# Patient Record
Sex: FEMALE | Race: WHITE | Employment: PART TIME | URBAN - METROPOLITAN AREA
[De-identification: names, ages, dates, MRNs, and addresses within clinical notes are randomized per-mention and may not be internally consistent; named-entity substitution may affect disease eponyms.]

---

## 2019-10-28 ENCOUNTER — HOSPITAL ENCOUNTER (OUTPATIENT)
Dept: LAB | Age: 64
Discharge: HOME | End: 2019-10-28

## 2019-10-28 ENCOUNTER — HOSPITAL ENCOUNTER (OUTPATIENT)
Dept: NON INVASIVE DIAGNOSTICS | Age: 64
Discharge: HOME | End: 2019-10-28
Attending: ORTHOPAEDIC SURGERY
Payer: COMMERCIAL

## 2019-10-28 DIAGNOSIS — Z01.818 OTHER SPECIFIED PRE-OPERATIVE EXAMINATION: ICD-10-CM

## 2019-10-28 DIAGNOSIS — M16.11 PRIMARY OSTEOARTHRITIS OF RIGHT HIP: ICD-10-CM

## 2019-10-28 LAB — SENTARA SPECIMEN COL,SENBCF: NORMAL

## 2019-10-28 PROCEDURE — 99001 SPECIMEN HANDLING PT-LAB: CPT

## 2019-10-28 PROCEDURE — 93005 ELECTROCARDIOGRAM TRACING: CPT

## 2019-10-29 LAB
ATRIAL RATE: 61 BPM
CALCULATED P AXIS, ECG09: 36 DEGREES
CALCULATED R AXIS, ECG10: 39 DEGREES
CALCULATED T AXIS, ECG11: 40 DEGREES
DIAGNOSIS, 93000: NORMAL
P-R INTERVAL, ECG05: 150 MS
Q-T INTERVAL, ECG07: 418 MS
QRS DURATION, ECG06: 96 MS
QTC CALCULATION (BEZET), ECG08: 420 MS
VENTRICULAR RATE, ECG03: 61 BPM

## 2019-11-19 ENCOUNTER — HOSPITAL ENCOUNTER (OUTPATIENT)
Dept: LAB | Age: 64
Discharge: HOME | End: 2019-11-19

## 2019-11-19 LAB — SENTARA SPECIMEN COL,SENBCF: NORMAL

## 2019-11-19 PROCEDURE — 99001 SPECIMEN HANDLING PT-LAB: CPT

## 2019-11-23 ENCOUNTER — ANESTHESIA EVENT (OUTPATIENT)
Dept: SURGERY | Age: 64
DRG: 470 | End: 2019-11-23
Payer: COMMERCIAL

## 2019-11-23 PROBLEM — M16.11 OSTEOARTHRITIS OF RIGHT HIP: Chronic | Status: ACTIVE | Noted: 2019-11-23

## 2019-11-23 NOTE — H&P
[address] [facility] [address] Medicine  History and Physical Exam    Patient: Linh Gomez MRN: [id number]  SSN: [id number]    YOB: 1955  Age: 59 y.o. Sex: female      Subjective:      Chief Complaint: right hip pain    History of Present Illness:  Patient complains of right hip pain and difficulty ambulating, which has progressed over the past several months. X-rays showed osteoarthritis of the joint. The patient's pain has persisted and progressed despite conservative treatments and therapies. The patient has been previously treated with medications and/or injections. The patient has at this time opted for surgical intervention. Past Medical History:   Diagnosis Date    Arrhythmia 2016    H/O     Arthritis     Autoimmune disease ([address]     H/O  sarcodosis    GERD (gastroesophageal reflux disease)     Osteoarthritis of right hip 11/23/2019    Sleep apnea     mouth guard  instructed to bring DOS    Thyroid disease      Past Surgical History:   Procedure Laterality Date    HX CATARACT REMOVAL      HX CERVICAL LAMINECTOMY      HX GASTRIC BYPASS  2016    sleeve    HX LAP CHOLECYSTECTOMY      HX ORTHOPAEDIC Right     ankle     Social History     Occupational History    Not on file   Tobacco Use    Smoking status: Never Smoker    Smokeless tobacco: Never Used   Substance and Sexual Activity    Alcohol use: Yes     Comment: 6 x year    Drug use: Not Currently    Sexual activity: Not on file     Prior to Admission medications    Medication Sig Start Date End Date Taking? Authorizing Provider   celecoxib (CELEBREX) 100 mg capsule Take 100 mg by mouth two (2) times a day. Provider, Historical   hydroCHLOROthiazide (HYDRODIURIL) 25 mg tablet Take 25 mg by mouth daily. Provider, Historical   levothyroxine (SYNTHROID) 50 mcg tablet Take 50 mcg by mouth Daily (before breakfast).     Provider, Historical   ergocalciferol (VITAMIN D2) 50,000 unit capsule Take 50,000 Units by mouth every seven (7) days. Provider, Historical   omeprazole (PRILOSEC OTC) 20 mg tablet Take 20 mg by mouth daily. Provider, Historical   acetaminophen (TYLENOL) 325 mg tablet Take 325 mg by mouth every four (4) hours as needed for Pain. Provider, Historical   inulin/cholecalciferol, D3, (FIBER GUMMIES WITH VITAMIN D3 PO) Take  by mouth. Provider, Historical       Allergies: Allergies   Allergen Reactions    Codeine Nausea and Vomiting    Erythromycin Nausea and Vomiting    Penicillins Nausea and Vomiting        Review of Systems:  Pertinent items are noted in the History of Present Illness. Objective:       Physical Exam:  HEENT: Normocephalic, atraumatic  Lungs:  Clear to auscultation  Heart:   Regular rate and rhythm  Abdomen: Soft  Extremities:  Pain with range of motion of the right hip. Passive flexion  degrees,                       passive internal rotation 0-10 degrees, with pain throughout ROM,                        passive external rotation 10-20 degrees with pain at the arc of motion. Antalgic gait noted. Assessment:      Arthritis of the right hip. Plan:       Proceed with scheduled RIGHT TOTAL HIP ARTHROPLASTY. The various methods of treatment have been discussed with the patient and family. After consideration of risks, benefits, and other options for treatment, the patient has consented to surgical interventions. Questions were answered and preoperative teaching was done by Dr Stephen Limon.      Signed By: MIKE Yu     November 23, 2019

## 2019-11-25 ENCOUNTER — HOSPITAL ENCOUNTER (INPATIENT)
Age: 64
LOS: 1 days | Discharge: HOME HEALTH | DRG: 470 | End: 2019-11-26
Attending: ORTHOPAEDIC SURGERY | Admitting: ORTHOPAEDIC SURGERY
Payer: COMMERCIAL

## 2019-11-25 ENCOUNTER — ANESTHESIA (OUTPATIENT)
Dept: SURGERY | Age: 64
DRG: 470 | End: 2019-11-25
Payer: COMMERCIAL

## 2019-11-25 ENCOUNTER — APPOINTMENT (OUTPATIENT)
Dept: GENERAL RADIOLOGY | Age: 64
DRG: 470 | End: 2019-11-25
Attending: ORTHOPAEDIC SURGERY
Payer: COMMERCIAL

## 2019-11-25 ENCOUNTER — APPOINTMENT (OUTPATIENT)
Dept: GENERAL RADIOLOGY | Age: 64
DRG: 470 | End: 2019-11-25
Attending: PHYSICIAN ASSISTANT
Payer: COMMERCIAL

## 2019-11-25 DIAGNOSIS — M16.11 PRIMARY OSTEOARTHRITIS OF RIGHT HIP: Primary | Chronic | ICD-10-CM

## 2019-11-25 LAB
ABO + RH BLD: NORMAL
ATRIAL RATE: 76 BPM
BLOOD GROUP ANTIBODIES SERPL: NORMAL
CALCULATED P AXIS, ECG09: 62 DEGREES
CALCULATED R AXIS, ECG10: -6 DEGREES
CALCULATED T AXIS, ECG11: 12 DEGREES
DIAGNOSIS, 93000: NORMAL
P-R INTERVAL, ECG05: 144 MS
Q-T INTERVAL, ECG07: 406 MS
QRS DURATION, ECG06: 94 MS
QTC CALCULATION (BEZET), ECG08: 456 MS
SPECIMEN EXP DATE BLD: NORMAL
VENTRICULAR RATE, ECG03: 76 BPM

## 2019-11-25 PROCEDURE — 77030003666 HC NDL SPINAL BD -A: Performed by: ORTHOPAEDIC SURGERY

## 2019-11-25 PROCEDURE — 74011250636 HC RX REV CODE- 250/636: Performed by: PHYSICIAN ASSISTANT

## 2019-11-25 PROCEDURE — 74011250636 HC RX REV CODE- 250/636: Performed by: ORTHOPAEDIC SURGERY

## 2019-11-25 PROCEDURE — 93005 ELECTROCARDIOGRAM TRACING: CPT

## 2019-11-25 PROCEDURE — 77030020782 HC GWN BAIR PAWS FLX 3M -B: Performed by: ORTHOPAEDIC SURGERY

## 2019-11-25 PROCEDURE — 77030037713 HC CLOSR DEV INCIS ZIP STRY -B: Performed by: ORTHOPAEDIC SURGERY

## 2019-11-25 PROCEDURE — 77030013708 HC HNDPC SUC IRR PULS STRY –B: Performed by: ORTHOPAEDIC SURGERY

## 2019-11-25 PROCEDURE — 74011250636 HC RX REV CODE- 250/636: Performed by: SPECIALIST

## 2019-11-25 PROCEDURE — 74011000250 HC RX REV CODE- 250: Performed by: ORTHOPAEDIC SURGERY

## 2019-11-25 PROCEDURE — 3E0U3BZ INTRODUCTION OF ANESTHETIC AGENT INTO JOINTS, PERCUTANEOUS APPROACH: ICD-10-PCS | Performed by: ORTHOPAEDIC SURGERY

## 2019-11-25 PROCEDURE — C1776 JOINT DEVICE (IMPLANTABLE): HCPCS | Performed by: ORTHOPAEDIC SURGERY

## 2019-11-25 PROCEDURE — 74011250637 HC RX REV CODE- 250/637: Performed by: PHYSICIAN ASSISTANT

## 2019-11-25 PROCEDURE — 77030038010: Performed by: ORTHOPAEDIC SURGERY

## 2019-11-25 PROCEDURE — 86900 BLOOD TYPING SEROLOGIC ABO: CPT

## 2019-11-25 PROCEDURE — 77030027138 HC INCENT SPIROMETER -A: Performed by: ORTHOPAEDIC SURGERY

## 2019-11-25 PROCEDURE — 76210000017 HC OR PH I REC 1.5 TO 2 HR: Performed by: ORTHOPAEDIC SURGERY

## 2019-11-25 PROCEDURE — 74011250637 HC RX REV CODE- 250/637: Performed by: SPECIALIST

## 2019-11-25 PROCEDURE — 0SR902A REPLACEMENT OF RIGHT HIP JOINT WITH METAL ON POLYETHYLENE SYNTHETIC SUBSTITUTE, UNCEMENTED, OPEN APPROACH: ICD-10-PCS | Performed by: ORTHOPAEDIC SURGERY

## 2019-11-25 PROCEDURE — 3E0U33Z INTRODUCTION OF ANTI-INFLAMMATORY INTO JOINTS, PERCUTANEOUS APPROACH: ICD-10-PCS | Performed by: ORTHOPAEDIC SURGERY

## 2019-11-25 PROCEDURE — 74011000250 HC RX REV CODE- 250: Performed by: NURSE ANESTHETIST, CERTIFIED REGISTERED

## 2019-11-25 PROCEDURE — 36415 COLL VENOUS BLD VENIPUNCTURE: CPT

## 2019-11-25 PROCEDURE — 76010000149 HC OR TIME 1 TO 1.5 HR: Performed by: ORTHOPAEDIC SURGERY

## 2019-11-25 PROCEDURE — 76060000033 HC ANESTHESIA 1 TO 1.5 HR: Performed by: ORTHOPAEDIC SURGERY

## 2019-11-25 PROCEDURE — 77030018836 HC SOL IRR NACL ICUM -A: Performed by: ORTHOPAEDIC SURGERY

## 2019-11-25 PROCEDURE — 77030040361 HC SLV COMPR DVT MDII -B: Performed by: ORTHOPAEDIC SURGERY

## 2019-11-25 PROCEDURE — 74011250636 HC RX REV CODE- 250/636: Performed by: NURSE ANESTHETIST, CERTIFIED REGISTERED

## 2019-11-25 PROCEDURE — 77030031139 HC SUT VCRL2 J&J -A: Performed by: ORTHOPAEDIC SURGERY

## 2019-11-25 PROCEDURE — 77030033263 HC DRSG MEPILEX 16-48IN BORD MOLN -B: Performed by: ORTHOPAEDIC SURGERY

## 2019-11-25 PROCEDURE — 73501 X-RAY EXAM HIP UNI 1 VIEW: CPT

## 2019-11-25 PROCEDURE — 77030034694 HC SCPL CANADY PLSM DISP USMD -E: Performed by: ORTHOPAEDIC SURGERY

## 2019-11-25 PROCEDURE — 77030012508 HC MSK AIRWY LMA AMBU -A: Performed by: SPECIALIST

## 2019-11-25 PROCEDURE — 65270000029 HC RM PRIVATE

## 2019-11-25 DEVICE — STEM FEM PRSS FT HIP HRD SURF: Type: IMPLANTABLE DEVICE | Site: HIP | Status: FUNCTIONAL

## 2019-11-25 DEVICE — FEMORAL HEAD 32-12/14+6
Type: IMPLANTABLE DEVICE | Site: HIP | Status: FUNCTIONAL
Brand: DELTA CERAMIC FEMORAL HEAD

## 2019-11-25 DEVICE — SIZE 9 STD COLLAR
Type: IMPLANTABLE DEVICE | Site: HIP | Status: FUNCTIONAL
Brand: ENTRADA HIP STEM

## 2019-11-25 DEVICE — ACETABULAR LINER NEUTRAL 32/50
Type: IMPLANTABLE DEVICE | Site: HIP | Status: FUNCTIONAL
Brand: LEGEND

## 2019-11-25 DEVICE — THREE HOLE, 50 MM
Type: IMPLANTABLE DEVICE | Site: HIP | Status: FUNCTIONAL
Brand: LEGEND ACETABULAR SHELL

## 2019-11-25 DEVICE — LOW PROFILE CANCELLOUS SCREW 25MM
Type: IMPLANTABLE DEVICE | Site: HIP | Status: FUNCTIONAL
Brand: ESCALADE ACETABULAR SYSTEM

## 2019-11-25 RX ORDER — ACETAMINOPHEN 500 MG
1000 TABLET ORAL ONCE
Status: DISCONTINUED | OUTPATIENT
Start: 2019-11-25 | End: 2019-11-25 | Stop reason: SDUPTHER

## 2019-11-25 RX ORDER — FENTANYL CITRATE 50 UG/ML
50 INJECTION, SOLUTION INTRAMUSCULAR; INTRAVENOUS
Status: DISCONTINUED | OUTPATIENT
Start: 2019-11-25 | End: 2019-11-25 | Stop reason: HOSPADM

## 2019-11-25 RX ORDER — FENTANYL CITRATE 50 UG/ML
25 INJECTION, SOLUTION INTRAMUSCULAR; INTRAVENOUS AS NEEDED
Status: DISCONTINUED | OUTPATIENT
Start: 2019-11-25 | End: 2019-11-25 | Stop reason: HOSPADM

## 2019-11-25 RX ORDER — ACETAMINOPHEN 325 MG/1
650 TABLET ORAL EVERY 6 HOURS
Status: DISCONTINUED | OUTPATIENT
Start: 2019-11-25 | End: 2019-11-26 | Stop reason: HOSPADM

## 2019-11-25 RX ORDER — TRANEXAMIC ACID 650 1/1
1950 TABLET ORAL ONCE
Status: COMPLETED | OUTPATIENT
Start: 2019-11-25 | End: 2019-11-25

## 2019-11-25 RX ORDER — KETOROLAC TROMETHAMINE 15 MG/ML
INJECTION, SOLUTION INTRAMUSCULAR; INTRAVENOUS AS NEEDED
Status: DISCONTINUED | OUTPATIENT
Start: 2019-11-25 | End: 2019-11-25 | Stop reason: HOSPADM

## 2019-11-25 RX ORDER — CEFAZOLIN SODIUM/WATER 2 G/20 ML
2 SYRINGE (ML) INTRAVENOUS EVERY 8 HOURS
Status: COMPLETED | OUTPATIENT
Start: 2019-11-25 | End: 2019-11-26

## 2019-11-25 RX ORDER — NALOXONE HYDROCHLORIDE 0.4 MG/ML
0.4 INJECTION, SOLUTION INTRAMUSCULAR; INTRAVENOUS; SUBCUTANEOUS AS NEEDED
Status: DISCONTINUED | OUTPATIENT
Start: 2019-11-25 | End: 2019-11-26 | Stop reason: HOSPADM

## 2019-11-25 RX ORDER — KETOROLAC TROMETHAMINE 30 MG/ML
30 INJECTION, SOLUTION INTRAMUSCULAR; INTRAVENOUS
Status: DISCONTINUED | OUTPATIENT
Start: 2019-11-25 | End: 2019-11-25 | Stop reason: HOSPADM

## 2019-11-25 RX ORDER — OXYCODONE AND ACETAMINOPHEN 5; 325 MG/1; MG/1
1 TABLET ORAL AS NEEDED
Status: DISCONTINUED | OUTPATIENT
Start: 2019-11-25 | End: 2019-11-25 | Stop reason: HOSPADM

## 2019-11-25 RX ORDER — DIPHENHYDRAMINE HYDROCHLORIDE 50 MG/ML
12.5 INJECTION, SOLUTION INTRAMUSCULAR; INTRAVENOUS
Status: DISCONTINUED | OUTPATIENT
Start: 2019-11-25 | End: 2019-11-26 | Stop reason: HOSPADM

## 2019-11-25 RX ORDER — ASPIRIN 81 MG/1
81 TABLET ORAL 2 TIMES DAILY
Qty: 42 TAB | Refills: 0 | Status: SHIPPED | OUTPATIENT
Start: 2019-11-25 | End: 2019-12-16

## 2019-11-25 RX ORDER — PREGABALIN 25 MG/1
25 CAPSULE ORAL ONCE
Status: COMPLETED | OUTPATIENT
Start: 2019-11-25 | End: 2019-11-25

## 2019-11-25 RX ORDER — ONDANSETRON 2 MG/ML
4 INJECTION INTRAMUSCULAR; INTRAVENOUS
Status: DISCONTINUED | OUTPATIENT
Start: 2019-11-25 | End: 2019-11-26 | Stop reason: HOSPADM

## 2019-11-25 RX ORDER — OXYCODONE AND ACETAMINOPHEN 5; 325 MG/1; MG/1
1 TABLET ORAL
Qty: 42 TAB | Refills: 0 | Status: SHIPPED | OUTPATIENT
Start: 2019-11-25 | End: 2019-12-02

## 2019-11-25 RX ORDER — ONDANSETRON 2 MG/ML
4 INJECTION INTRAMUSCULAR; INTRAVENOUS ONCE
Status: COMPLETED | OUTPATIENT
Start: 2019-11-25 | End: 2019-11-25

## 2019-11-25 RX ORDER — GLYCOPYRROLATE 0.2 MG/ML
INJECTION INTRAMUSCULAR; INTRAVENOUS AS NEEDED
Status: DISCONTINUED | OUTPATIENT
Start: 2019-11-25 | End: 2019-11-25 | Stop reason: HOSPADM

## 2019-11-25 RX ORDER — SODIUM CHLORIDE 0.9 % (FLUSH) 0.9 %
5-40 SYRINGE (ML) INJECTION EVERY 8 HOURS
Status: DISCONTINUED | OUTPATIENT
Start: 2019-11-25 | End: 2019-11-26 | Stop reason: HOSPADM

## 2019-11-25 RX ORDER — CELECOXIB 100 MG/1
400 CAPSULE ORAL ONCE
Status: COMPLETED | OUTPATIENT
Start: 2019-11-25 | End: 2019-11-25

## 2019-11-25 RX ORDER — DEXTROSE MONOHYDRATE 100 MG/ML
125-250 INJECTION, SOLUTION INTRAVENOUS AS NEEDED
Status: DISCONTINUED | OUTPATIENT
Start: 2019-11-25 | End: 2019-11-25 | Stop reason: HOSPADM

## 2019-11-25 RX ORDER — ASPIRIN 81 MG/1
81 TABLET ORAL 2 TIMES DAILY
Status: DISCONTINUED | OUTPATIENT
Start: 2019-11-25 | End: 2019-11-26 | Stop reason: HOSPADM

## 2019-11-25 RX ORDER — LANOLIN ALCOHOL/MO/W.PET/CERES
1 CREAM (GRAM) TOPICAL 3 TIMES DAILY
Status: DISCONTINUED | OUTPATIENT
Start: 2019-11-25 | End: 2019-11-26 | Stop reason: HOSPADM

## 2019-11-25 RX ORDER — SODIUM CHLORIDE 0.9 % (FLUSH) 0.9 %
5-40 SYRINGE (ML) INJECTION AS NEEDED
Status: DISCONTINUED | OUTPATIENT
Start: 2019-11-25 | End: 2019-11-26 | Stop reason: HOSPADM

## 2019-11-25 RX ORDER — KETOROLAC TROMETHAMINE 30 MG/ML
15 INJECTION, SOLUTION INTRAMUSCULAR; INTRAVENOUS EVERY 6 HOURS
Status: DISCONTINUED | OUTPATIENT
Start: 2019-11-25 | End: 2019-11-26 | Stop reason: HOSPADM

## 2019-11-25 RX ORDER — SODIUM CHLORIDE, SODIUM LACTATE, POTASSIUM CHLORIDE, CALCIUM CHLORIDE 600; 310; 30; 20 MG/100ML; MG/100ML; MG/100ML; MG/100ML
125 INJECTION, SOLUTION INTRAVENOUS CONTINUOUS
Status: DISCONTINUED | OUTPATIENT
Start: 2019-11-25 | End: 2019-11-26 | Stop reason: HOSPADM

## 2019-11-25 RX ORDER — ZOLPIDEM TARTRATE 5 MG/1
5-10 TABLET ORAL
Status: DISCONTINUED | OUTPATIENT
Start: 2019-11-25 | End: 2019-11-26 | Stop reason: HOSPADM

## 2019-11-25 RX ORDER — METOCLOPRAMIDE HYDROCHLORIDE 5 MG/ML
10 INJECTION INTRAMUSCULAR; INTRAVENOUS
Status: DISCONTINUED | OUTPATIENT
Start: 2019-11-25 | End: 2019-11-26 | Stop reason: HOSPADM

## 2019-11-25 RX ORDER — LEVOTHYROXINE SODIUM 50 UG/1
50 TABLET ORAL
Status: DISCONTINUED | OUTPATIENT
Start: 2019-11-26 | End: 2019-11-26 | Stop reason: HOSPADM

## 2019-11-25 RX ORDER — SODIUM CHLORIDE 0.9 % (FLUSH) 0.9 %
5-40 SYRINGE (ML) INJECTION AS NEEDED
Status: DISCONTINUED | OUTPATIENT
Start: 2019-11-25 | End: 2019-11-25 | Stop reason: HOSPADM

## 2019-11-25 RX ORDER — LABETALOL HCL 20 MG/4 ML
SYRINGE (ML) INTRAVENOUS AS NEEDED
Status: DISCONTINUED | OUTPATIENT
Start: 2019-11-25 | End: 2019-11-25 | Stop reason: HOSPADM

## 2019-11-25 RX ORDER — HYDROMORPHONE HYDROCHLORIDE 2 MG/ML
INJECTION, SOLUTION INTRAMUSCULAR; INTRAVENOUS; SUBCUTANEOUS AS NEEDED
Status: DISCONTINUED | OUTPATIENT
Start: 2019-11-25 | End: 2019-11-25 | Stop reason: HOSPADM

## 2019-11-25 RX ORDER — HYDROMORPHONE HYDROCHLORIDE 2 MG/ML
0.2 INJECTION, SOLUTION INTRAMUSCULAR; INTRAVENOUS; SUBCUTANEOUS
Status: DISCONTINUED | OUTPATIENT
Start: 2019-11-25 | End: 2019-11-25 | Stop reason: HOSPADM

## 2019-11-25 RX ORDER — DOCUSATE SODIUM 100 MG/1
100 CAPSULE, LIQUID FILLED ORAL DAILY
Status: DISCONTINUED | OUTPATIENT
Start: 2019-11-26 | End: 2019-11-26 | Stop reason: HOSPADM

## 2019-11-25 RX ORDER — CEFAZOLIN SODIUM/WATER 2 G/20 ML
2 SYRINGE (ML) INTRAVENOUS ONCE
Status: COMPLETED | OUTPATIENT
Start: 2019-11-25 | End: 2019-11-25

## 2019-11-25 RX ORDER — ACETAMINOPHEN 500 MG
1000 TABLET ORAL ONCE
Status: COMPLETED | OUTPATIENT
Start: 2019-11-25 | End: 2019-11-25

## 2019-11-25 RX ORDER — DEXMEDETOMIDINE HYDROCHLORIDE 100 UG/ML
INJECTION, SOLUTION INTRAVENOUS AS NEEDED
Status: DISCONTINUED | OUTPATIENT
Start: 2019-11-25 | End: 2019-11-25 | Stop reason: HOSPADM

## 2019-11-25 RX ORDER — OMEPRAZOLE 20 MG/1
20 CAPSULE, DELAYED RELEASE ORAL DAILY
Status: DISCONTINUED | OUTPATIENT
Start: 2019-11-26 | End: 2019-11-26 | Stop reason: HOSPADM

## 2019-11-25 RX ORDER — KETAMINE HYDROCHLORIDE 10 MG/ML
INJECTION, SOLUTION INTRAMUSCULAR; INTRAVENOUS AS NEEDED
Status: DISCONTINUED | OUTPATIENT
Start: 2019-11-25 | End: 2019-11-25 | Stop reason: HOSPADM

## 2019-11-25 RX ORDER — MIDAZOLAM HYDROCHLORIDE 1 MG/ML
INJECTION, SOLUTION INTRAMUSCULAR; INTRAVENOUS AS NEEDED
Status: DISCONTINUED | OUTPATIENT
Start: 2019-11-25 | End: 2019-11-25 | Stop reason: HOSPADM

## 2019-11-25 RX ORDER — LIDOCAINE HYDROCHLORIDE 20 MG/ML
INJECTION, SOLUTION EPIDURAL; INFILTRATION; INTRACAUDAL; PERINEURAL AS NEEDED
Status: DISCONTINUED | OUTPATIENT
Start: 2019-11-25 | End: 2019-11-25 | Stop reason: HOSPADM

## 2019-11-25 RX ORDER — ONDANSETRON 2 MG/ML
INJECTION INTRAMUSCULAR; INTRAVENOUS AS NEEDED
Status: DISCONTINUED | OUTPATIENT
Start: 2019-11-25 | End: 2019-11-25 | Stop reason: HOSPADM

## 2019-11-25 RX ORDER — SODIUM CHLORIDE 9 MG/ML
125 INJECTION, SOLUTION INTRAVENOUS CONTINUOUS
Status: DISCONTINUED | OUTPATIENT
Start: 2019-11-25 | End: 2019-11-26 | Stop reason: HOSPADM

## 2019-11-25 RX ORDER — HYDROCHLOROTHIAZIDE 25 MG/1
25 TABLET ORAL DAILY
Status: DISCONTINUED | OUTPATIENT
Start: 2019-11-26 | End: 2019-11-26 | Stop reason: HOSPADM

## 2019-11-25 RX ORDER — SODIUM CHLORIDE, SODIUM LACTATE, POTASSIUM CHLORIDE, CALCIUM CHLORIDE 600; 310; 30; 20 MG/100ML; MG/100ML; MG/100ML; MG/100ML
100 INJECTION, SOLUTION INTRAVENOUS CONTINUOUS
Status: DISCONTINUED | OUTPATIENT
Start: 2019-11-25 | End: 2019-11-25 | Stop reason: HOSPADM

## 2019-11-25 RX ORDER — SODIUM CHLORIDE 9 MG/ML
300 INJECTION, SOLUTION INTRAVENOUS CONTINUOUS
Status: DISPENSED | OUTPATIENT
Start: 2019-11-25 | End: 2019-11-25

## 2019-11-25 RX ORDER — PROPOFOL 10 MG/ML
INJECTION, EMULSION INTRAVENOUS AS NEEDED
Status: DISCONTINUED | OUTPATIENT
Start: 2019-11-25 | End: 2019-11-25 | Stop reason: HOSPADM

## 2019-11-25 RX ORDER — OXYCODONE HYDROCHLORIDE 5 MG/1
5-10 TABLET ORAL
Status: DISCONTINUED | OUTPATIENT
Start: 2019-11-25 | End: 2019-11-26 | Stop reason: HOSPADM

## 2019-11-25 RX ORDER — METHYLPREDNISOLONE ACETATE 80 MG/ML
INJECTION, SUSPENSION INTRA-ARTICULAR; INTRALESIONAL; INTRAMUSCULAR; SOFT TISSUE AS NEEDED
Status: DISCONTINUED | OUTPATIENT
Start: 2019-11-25 | End: 2019-11-25 | Stop reason: HOSPADM

## 2019-11-25 RX ORDER — MAGNESIUM SULFATE 100 %
4 CRYSTALS MISCELLANEOUS AS NEEDED
Status: DISCONTINUED | OUTPATIENT
Start: 2019-11-25 | End: 2019-11-25 | Stop reason: HOSPADM

## 2019-11-25 RX ORDER — DEXAMETHASONE SODIUM PHOSPHATE 4 MG/ML
8 INJECTION, SOLUTION INTRA-ARTICULAR; INTRALESIONAL; INTRAMUSCULAR; INTRAVENOUS; SOFT TISSUE ONCE
Status: COMPLETED | OUTPATIENT
Start: 2019-11-25 | End: 2019-11-25

## 2019-11-25 RX ORDER — SCOLOPAMINE TRANSDERMAL SYSTEM 1 MG/1
1 PATCH, EXTENDED RELEASE TRANSDERMAL
Status: DISCONTINUED | OUTPATIENT
Start: 2019-11-25 | End: 2019-11-26 | Stop reason: HOSPADM

## 2019-11-25 RX ORDER — SODIUM CHLORIDE 0.9 % (FLUSH) 0.9 %
5-40 SYRINGE (ML) INJECTION EVERY 8 HOURS
Status: DISCONTINUED | OUTPATIENT
Start: 2019-11-25 | End: 2019-11-25 | Stop reason: HOSPADM

## 2019-11-25 RX ADMIN — LABETALOL 20 MG/4 ML (5 MG/ML) INTRAVENOUS SYRINGE 10 MG: at 14:50

## 2019-11-25 RX ADMIN — CELECOXIB 400 MG: 100 CAPSULE ORAL at 12:52

## 2019-11-25 RX ADMIN — LIDOCAINE HYDROCHLORIDE 80 MG: 20 INJECTION, SOLUTION EPIDURAL; INFILTRATION; INTRACAUDAL; PERINEURAL at 14:45

## 2019-11-25 RX ADMIN — TRANEXAMIC ACID 1950 MG: 650 TABLET ORAL at 12:05

## 2019-11-25 RX ADMIN — ONDANSETRON HYDROCHLORIDE 4 MG: 2 INJECTION INTRAMUSCULAR; INTRAVENOUS at 14:47

## 2019-11-25 RX ADMIN — HYDROMORPHONE HYDROCHLORIDE 2 MG: 2 INJECTION, SOLUTION INTRAMUSCULAR; INTRAVENOUS; SUBCUTANEOUS at 14:45

## 2019-11-25 RX ADMIN — METOCLOPRAMIDE 10 MG: 5 INJECTION, SOLUTION INTRAMUSCULAR; INTRAVENOUS at 22:37

## 2019-11-25 RX ADMIN — SODIUM CHLORIDE 300 ML/HR: 900 INJECTION, SOLUTION INTRAVENOUS at 18:46

## 2019-11-25 RX ADMIN — FERROUS SULFATE TAB 325 MG (65 MG ELEMENTAL FE) 325 MG: 325 (65 FE) TAB at 22:16

## 2019-11-25 RX ADMIN — GLYCOPYRROLATE 0.2 MG: 0.2 INJECTION INTRAMUSCULAR; INTRAVENOUS at 14:39

## 2019-11-25 RX ADMIN — ACETAMINOPHEN 1000 MG: 500 TABLET ORAL at 12:52

## 2019-11-25 RX ADMIN — SODIUM CHLORIDE 125 ML/HR: 900 INJECTION, SOLUTION INTRAVENOUS at 19:44

## 2019-11-25 RX ADMIN — ONDANSETRON 4 MG: 2 INJECTION INTRAMUSCULAR; INTRAVENOUS at 17:29

## 2019-11-25 RX ADMIN — SODIUM CHLORIDE, SODIUM LACTATE, POTASSIUM CHLORIDE, AND CALCIUM CHLORIDE: 600; 310; 30; 20 INJECTION, SOLUTION INTRAVENOUS at 15:30

## 2019-11-25 RX ADMIN — SODIUM CHLORIDE, SODIUM LACTATE, POTASSIUM CHLORIDE, AND CALCIUM CHLORIDE 1000 ML: 600; 310; 30; 20 INJECTION, SOLUTION INTRAVENOUS at 12:13

## 2019-11-25 RX ADMIN — Medication 2 G: at 14:41

## 2019-11-25 RX ADMIN — PREGABALIN 25 MG: 25 CAPSULE ORAL at 12:52

## 2019-11-25 RX ADMIN — KETAMINE HYDROCHLORIDE 50 MG: 10 INJECTION, SOLUTION INTRAMUSCULAR; INTRAVENOUS at 14:45

## 2019-11-25 RX ADMIN — ASPIRIN 81 MG: 81 TABLET, COATED ORAL at 22:16

## 2019-11-25 RX ADMIN — KETOROLAC TROMETHAMINE 15 MG: 30 INJECTION, SOLUTION INTRAMUSCULAR at 22:16

## 2019-11-25 RX ADMIN — DEXAMETHASONE SODIUM PHOSPHATE 8 MG: 4 INJECTION, SOLUTION INTRAMUSCULAR; INTRAVENOUS at 12:52

## 2019-11-25 RX ADMIN — KETOROLAC TROMETHAMINE 30 MG: 15 INJECTION, SOLUTION INTRAMUSCULAR; INTRAVENOUS at 15:42

## 2019-11-25 RX ADMIN — SODIUM CHLORIDE, SODIUM LACTATE, POTASSIUM CHLORIDE, AND CALCIUM CHLORIDE 125 ML/HR: 600; 310; 30; 20 INJECTION, SOLUTION INTRAVENOUS at 18:09

## 2019-11-25 RX ADMIN — SODIUM CHLORIDE, SODIUM LACTATE, POTASSIUM CHLORIDE, AND CALCIUM CHLORIDE 125 ML/HR: 600; 310; 30; 20 INJECTION, SOLUTION INTRAVENOUS at 12:12

## 2019-11-25 RX ADMIN — DEXMEDETOMIDINE HYDROCHLORIDE 16 MCG: 100 INJECTION, SOLUTION INTRAVENOUS at 14:47

## 2019-11-25 RX ADMIN — Medication 2 G: at 22:15

## 2019-11-25 RX ADMIN — ONDANSETRON 4 MG: 2 INJECTION INTRAMUSCULAR; INTRAVENOUS at 20:14

## 2019-11-25 RX ADMIN — PROPOFOL 200 MG: 10 INJECTION, EMULSION INTRAVENOUS at 14:45

## 2019-11-25 RX ADMIN — MIDAZOLAM 2 MG: 1 INJECTION INTRAMUSCULAR; INTRAVENOUS at 14:39

## 2019-11-25 NOTE — DISCHARGE INSTRUCTIONS
[address] [place] [address] Sports Medicine   Patient Discharge Instructions    Jody Lopez / [id number] : 1955    Admitted 2019 Discharged: 2019     IF YOU HAVE ANY PROBLEMS ONCE YOU ARE  [facility] FOLLOWING NUMBERS:   Main office number: [phone]    Your follow up appointment to see either Dr. Kelsie Fountain PA-C, or [facility] Y[facility] as scheduled in 2 weeks. If you are unsure of your appointment date call the office at [phone]. Medication Instructions     · Resume your home medictions as directed, you may have directed not to resume supplements until after your follow up. · A prescription for pain medication has been given   · It is important that you take the medication exactly as they are prescribed. · Keep your medication in the bottles provided by the pharmacist and keep a list of the medication names, dosages, and times to be taken in your wallet. · Do not take other medications without consulting your doctor. What to do at [address] [place] [address] Ave your prehospital diet. If you have excessive nausea or vomitting call your doctor's office. Be sure to maintain adequate fluid intake. Some pain medications may cause constipation. Remember to drink fluids, stay as active as possible, and eat plenty of fiber-rich foods. Begin In-Home Physical Therapy; 3 times a week to work on gait training, range of motion, strengthening, and weight bearing exercises as tolerable. Continue to use your walker or cane when walking. May progress from the walker to a cane to complete total bearing as tolerable. Patient may shower. Wrap incision with plastic wrap/covering to prevent incision from getting wet. Avoid complete immersion. YOUR DRESSING SHOULD BE CHANGED BY YOUR HOME HEALTH NURSE 3-5 DAYS AFTER SURGERY.           When to Call    - Call if you have a temperature greater then 101  - Unable to keep food down  - Are unable to bear any wieght   - Need a pain medication refill     Information obtained by :  I understand that if any problems occur once I am at home I am to contact my physician. I understand and acknowledge receipt of the instructions indicated above.                                                                                                                                            Physician's or R.N.'s Signature                                                                  Date/Time                                                                                                                                              Patient or Representative Signature                                                          Date/Time

## 2019-11-25 NOTE — INTERVAL H&P NOTE
H&P Update: 
Mireya Whyte was seen and examined. History and physical has been reviewed. The patient has been examined.  There have been no significant clinical changes since the completion of the originally dated History and Physical.

## 2019-11-25 NOTE — OP NOTES
[address] [facility] [address] Medicine  Total Hip Arthroplasty      Patient: Sarah Meadows MRN: [id number]  SSN: [id number]    YOB: 1955  Age: 59 y.o. Sex: female      Date of Surgery: 11/25/2019   Preoperative Diagnosis: RIGHT HIP OSTEOARTHRITIS , left knee effusion  Postoperative Diagnosis: RIGHT HIP OSTEOARTHRITIS , left knee effusion  Location: [facility]  Surgeon: Oscar Tyson MD  Assistant: Rudy Márquez PA-C    Anesthesia: general    Procedure: Total Right Hip Arthroplasty and injection left knee    Findings: Degenerative joint disease of the right hip. Estimated Blood Loss: 300ml    Specimens: None    Complications: none    Implants:   Implant Name Type Inv. Item Serial No.  Lot No. LRB No. Used Action   SHELL ACET 3H 50MM -- LEGEND - SN/A  SHELL ACET 3H 50MM -- LEGEND N/A ORTHO iGo [id number] Right 1 Implanted   LINER ACET NEUT 32/50MM -- LEGEND - SN/A  LINER ACET NEUT 32/50MM -- LEGEND N/A ORTHO DEVELOPMENT AV [id number] Right 1 Implanted   SCR BNE CANC 6.5X25MM LP -- ESCALADE - SN/A  SCR BNE CANC 6.5X25MM LP -- ESCALADE N/A ORTHO iGo [id number] Right 1 Implanted   STEM HIP CLLRD SZ9 STD -- ENTRADA - SN/A  STEM HIP CLLRD SZ9 STD -- ENTRADA N/A [employer] [id number] Right 1 Implanted   HEAD FEM 32-12/14+6MM -- DELTA - SN/A  HEAD FEM 32-12/14+6MM -- DELTA N/A [employer] [id number] Right 1 Implanted       Procedure Detail:  After the patient was brought to the operating suite, She was effectively anesthetized using general anesthesia, then transferred to the [place] table and secured in a standard fashion. Her right hip was then prepped and draped in a normal sterile orthopedic fashion. She was given appropriate intravenous antibiotics preoperatively. After a proper timeout was performed, a direct anterior approach to the hip was performed using a short [place] interval. Anterior capsulotomy was performed.  The degenerative changes of the hip were noted. Femoral neck osteotomy was then performed to the templated area. The head and neck were removed. The pulvinar and labrum were excised. The acetabulum was then reamed up to 50 mm with good bleeding cancellous bone obtained. The cup was then irrigated with pulse lavage system. A 50 mm orthodevelopment Legend cup was then impacted in place with excellent stable fixation obtained, placing the cup at about 45 degrees of abduction, 20 degrees of anteversion. The liner was then impacted in place. A screw was not placed. Attention was turned to the femur, which was delivered into the wound with a combination of extension, external rotation, and adduction, and using the hook on the [place] table to deliver the femur into the wound. The canal was broached up to a size 9 for the Entrada stem system with excellent stable fixation obtained. A trial reduction was then performed with the standard neck offset and 32 mm head balls with various neck lengths. With the +6, she appeared to have equalization of leg lengths and restoration of offset radiographically, and excellent functional stability was noted. The trial broach was removed. The canal was irrigated with the pulse lavage system. The final components were impacted in place with excellent stable fixation obtained once again. The final reduction was performed and once again leg lengths and offset were restored radiographically, using the C-arm radiographically intraoperatively, and excellent functional stability was noted. The wound was then irrigated one more time, and then closed in layers. The fascia of the tensor was closed with #1 Vicryl in a running type stitch. Subcutaneous tissue was closed with 2-0 Vicryl in a simple buried stitch, and the skin was closed with Prineo. Dry, sterile dressing was then applied.     Her left knee was injected under sterile conditions with 80mg of depomedrol and 5cc of 0.5% marcaine She tolerated this well, was transferred to the bed, and taken to recovery room, extubated, in stable condition. All sponge and needle counts were correct.     Signed By: Pérez Yin MD     November 25, 2019

## 2019-11-25 NOTE — DISCHARGE SUMMARY
[address] [address]     DISCHARGE SUMMARY     PATIENT: Soy Hedrick     MRN: [id number]   ADMIT DATE: 2019   [place]   DISCHARGE DATE:      ATTENDING: Loretta Ahmadi MD   DICTATING: Sherren Grade, PA     ADMISSION DIAGNOSIS: Osteoarthritis of right hip [M16.11]    DISCHARGE DIAGNOSIS: Status post RIGHT TOTAL HIP ARTHROPLASTY    HISTORY OF PRESENT ILLNESS: The patient is a 59y.o. year-old female   with ongoing right hip pain secondary to osteoarthritis of right hip. The patient's pain has persisted and progressed despite conservative treatments and therapies. The patient has at this time opted for surgical intervention. PAST MEDICAL HISTORY:   Past Medical History:   Diagnosis Date    Arrhythmia 2016    H/O     Arthritis     Autoimmune disease ([address]     H/O  sarcodosis    GERD (gastroesophageal reflux disease)     Osteoarthritis of right hip 2019    Sleep apnea     mouth guard  instructed to bring DOS    Thyroid disease        PAST SURGICAL HISTORY:   Past Surgical History:   Procedure Laterality Date    HX CATARACT REMOVAL      HX CERVICAL LAMINECTOMY      HX GASTRIC BYPASS  2016    sleeve    HX LAP CHOLECYSTECTOMY      HX ORTHOPAEDIC Right     ankle       ALLERGIES:   Allergies   Allergen Reactions    Codeine Nausea and Vomiting    Erythromycin Nausea and Vomiting    Penicillins Nausea and Vomiting        CURRENT MEDICATIONS:  A list of medications prior to the time of admission include:  Prior to Admission medications    Medication Sig Start Date End Date Taking? Authorizing Provider   aspirin delayed-release 81 mg tablet Take 1 Tab by mouth two (2) times a day for 21 days. 19 Yes Nilton Rausch PA   oxyCODONE-acetaminophen (PERCOCET) 5-325 mg per tablet Take 1 Tab by mouth every four (4) hours as needed for Pain for up to 7 days. Max Daily Amount: 6 Tabs.  19 Yes Nelida Rausch PA   celecoxib (CELEBREX) 100 mg capsule Take 100 mg by mouth two (2) times a day. Yes Provider, Historical   hydroCHLOROthiazide (HYDRODIURIL) 25 mg tablet Take 25 mg by mouth daily. Yes Provider, Historical   levothyroxine (SYNTHROID) 50 mcg tablet Take 50 mcg by mouth Daily (before breakfast). Yes Provider, Historical   ergocalciferol (VITAMIN D2) 50,000 unit capsule Take 50,000 Units by mouth every Sunday. Yes Provider, Historical   omeprazole (PRILOSEC OTC) 20 mg tablet Take 20 mg by mouth daily as needed. Yes Provider, Historical   acetaminophen (TYLENOL) 325 mg tablet Take 325 mg by mouth every four (4) hours as needed for Pain. Yes Provider, Historical   inulin/cholecalciferol, D3, (FIBER GUMMIES WITH VITAMIN D3 PO) Take  by mouth. Yes Provider, Historical       FAMILY HISTORY: History reviewed. No pertinent family history. SOCIAL HISTORY:   Social History     Socioeconomic History    Marital status: SINGLE     Spouse name: Not on file    Number of children: Not on file    Years of education: Not on file    Highest education level: Not on file   Tobacco Use    Smoking status: Never Smoker    Smokeless tobacco: Never Used   Substance and Sexual Activity    Alcohol use: Yes     Comment: 6 x year    Drug use: Not Currently       REVIEW OF SYSTEMS: All review of systems are negative. PHYSICAL EXAMINATION: For a detailed physical exam, please refer to the patient's chart. HOSPITAL COURSE: The patient was taken to surgery the day of admission. she underwent right total hip replacement via the anterior approach. Operative course was benign. Estimated blood loss approximately 300 cc. The patient was taken to the PACU in stable condition and was later taken to the floor in stable condition. Post-op Day #1, patient has done very well.  she has had little to no pain. she had been cleared by physical therapy with stair training.   she was placed on Aspirin for DVT prophylaxis. her vitals have remained stable. she has also remained hemodynamically stable. The patient has been recommended for discharge home. DISCHARGE INSTRUCTIONS: The patient is to be discharged home. Current Discharge Medication List      START taking these medications    Details   aspirin delayed-release 81 mg tablet Take 1 Tab by mouth two (2) times a day for 21 days. Qty: 42 Tab, Refills: 0      oxyCODONE-acetaminophen (PERCOCET) 5-325 mg per tablet Take 1 Tab by mouth every four (4) hours as needed for Pain for up to 7 days. Max Daily Amount: 6 Tabs. Qty: 42 Tab, Refills: 0    Associated Diagnoses: Primary osteoarthritis of right hip         CONTINUE these medications which have NOT CHANGED    Details   hydroCHLOROthiazide (HYDRODIURIL) 25 mg tablet Take 25 mg by mouth daily. levothyroxine (SYNTHROID) 50 mcg tablet Take 50 mcg by mouth Daily (before breakfast). ergocalciferol (VITAMIN D2) 50,000 unit capsule Take 50,000 Units by mouth every Sunday. omeprazole (PRILOSEC OTC) 20 mg tablet Take 20 mg by mouth daily as needed. inulin/cholecalciferol, D3, (FIBER GUMMIES WITH VITAMIN D3 PO) Take  by mouth. STOP taking these medications       celecoxib (CELEBREX) 100 mg capsule Comments:   Reason for Stopping:         acetaminophen (TYLENOL) 325 mg tablet Comments:   Reason for Stopping:                 The patient is to continue at home with home physical therapy 3 times a week to work on gait training, range of motion, strengthening, and weightbearing exercises as tolerated on her right lower extremity. The patient is to progress from a walker to a cane to complete total weightbearing as tolerable. The patient is to continue to keep her incision dry. The patient is to followup with Dr. Aldon Saint, Igor Hill PA-C, and/or [facility] in the office approximately 10-14 days status post for x-rays and further evaluation.       Wesley Charles PA  11/26/2019

## 2019-11-25 NOTE — ANESTHESIA POSTPROCEDURE EVALUATION
Procedure(s):  RIGHT TOTAL HIP REPLACEMENT ANTERIOR APPROACH WITH C-ARM.     general    Anesthesia Post Evaluation      Multimodal analgesia: multimodal analgesia used between 6 hours prior to anesthesia start to PACU discharge  Patient location during evaluation: PACU  Patient participation: complete - patient participated  Level of consciousness: awake and alert  Pain management: satisfactory to patient  Airway patency: patent  Anesthetic complications: no  Cardiovascular status: acceptable and hemodynamically stable  Respiratory status: acceptable and nasal cannula  Hydration status: acceptable  Post anesthesia nausea and vomiting:  none      Vitals Value Taken Time   /59 11/25/2019  5:45 PM   Temp 36.9 °C (98.5 °F) 11/25/2019  4:14 PM   Pulse 70 11/25/2019  5:49 PM   Resp 16 11/25/2019  5:49 PM   SpO2 100 % 11/25/2019  5:49 PM

## 2019-11-25 NOTE — ANESTHESIA PREPROCEDURE EVALUATION
Relevant Problems   No relevant active problems       Anesthetic History   No history of anesthetic complications     Pertinent negatives: No PONV       Review of Systems / Medical History  Patient summary reviewed, nursing notes reviewed and pertinent labs reviewed    Pulmonary        Sleep apnea ( uses mouth guard)      Pertinent negatives: No smoker  Comments: Sarcoid, in remission. Neuro/Psych   Within defined limits           Cardiovascular            Dysrhythmias (unifocal PVC)     Pertinent negatives: No past MI and CAD       GI/Hepatic/Renal     GERD: well controlled        Pertinent negatives: No liver disease and renal disease   Endo/Other      Hypothyroidism: well controlled  Arthritis  Pertinent negatives: No diabetes   Other Findings              Physical Exam    Airway  Mallampati: I  TM Distance: > 6 cm  Neck ROM: normal range of motion   Mouth opening: Normal     Cardiovascular               Dental    Dentition: Caps/crowns  Comments: Front teeth native.     Pulmonary                 Abdominal         Other Findings            Anesthetic Plan    ASA: 2  Anesthesia type: general          Induction: Intravenous  Anesthetic plan and risks discussed with: Patient

## 2019-11-25 NOTE — PERIOP NOTES
Pt. Used restroom in pre-op area with assistance. Patient placed on Gladis Paws for a minimum of 30 min in  Preop.

## 2019-11-26 ENCOUNTER — HOME HEALTH ADMISSION (OUTPATIENT)
Dept: HOME HEALTH SERVICES | Facility: HOME HEALTH | Age: 64
End: 2019-11-26
Payer: COMMERCIAL

## 2019-11-26 VITALS
TEMPERATURE: 99.2 F | BODY MASS INDEX: 36.14 KG/M2 | DIASTOLIC BLOOD PRESSURE: 54 MMHG | WEIGHT: 204 LBS | HEART RATE: 77 BPM | OXYGEN SATURATION: 100 % | SYSTOLIC BLOOD PRESSURE: 105 MMHG | RESPIRATION RATE: 16 BRPM | HEIGHT: 63 IN

## 2019-11-26 LAB
ANION GAP SERPL CALC-SCNC: 6 MMOL/L (ref 3–18)
BASOPHILS # BLD: 0 K/UL (ref 0–0.1)
BASOPHILS NFR BLD: 0 % (ref 0–2)
BUN SERPL-MCNC: 16 MG/DL (ref 7–18)
BUN/CREAT SERPL: 18 (ref 12–20)
CALCIUM SERPL-MCNC: 8.2 MG/DL (ref 8.5–10.1)
CHLORIDE SERPL-SCNC: 106 MMOL/L (ref 100–111)
CO2 SERPL-SCNC: 28 MMOL/L (ref 21–32)
CREAT SERPL-MCNC: 0.91 MG/DL (ref 0.6–1.3)
DIFFERENTIAL METHOD BLD: ABNORMAL
EOSINOPHIL # BLD: 0 K/UL (ref 0–0.4)
EOSINOPHIL NFR BLD: 0 % (ref 0–5)
ERYTHROCYTE [DISTWIDTH] IN BLOOD BY AUTOMATED COUNT: 13.7 % (ref 11.6–14.5)
GLUCOSE SERPL-MCNC: 124 MG/DL (ref 74–99)
HCT VFR BLD AUTO: 31.1 % (ref 35–45)
HGB BLD-MCNC: 9.9 G/DL (ref 12–16)
LYMPHOCYTES # BLD: 1 K/UL (ref 0.9–3.6)
LYMPHOCYTES NFR BLD: 7 % (ref 21–52)
MCH RBC QN AUTO: 28.9 PG (ref 24–34)
MCHC RBC AUTO-ENTMCNC: 31.8 G/DL (ref 31–37)
MCV RBC AUTO: 90.9 FL (ref 74–97)
MONOCYTES # BLD: 0.8 K/UL (ref 0.05–1.2)
MONOCYTES NFR BLD: 5 % (ref 3–10)
NEUTS SEG # BLD: 12.9 K/UL (ref 1.8–8)
NEUTS SEG NFR BLD: 88 % (ref 40–73)
PLATELET # BLD AUTO: 244 K/UL (ref 135–420)
PMV BLD AUTO: 10 FL (ref 9.2–11.8)
POTASSIUM SERPL-SCNC: 4.2 MMOL/L (ref 3.5–5.5)
RBC # BLD AUTO: 3.42 M/UL (ref 4.2–5.3)
SODIUM SERPL-SCNC: 140 MMOL/L (ref 136–145)
WBC # BLD AUTO: 14.7 K/UL (ref 4.6–13.2)

## 2019-11-26 PROCEDURE — 85025 COMPLETE CBC W/AUTO DIFF WBC: CPT

## 2019-11-26 PROCEDURE — 80048 BASIC METABOLIC PNL TOTAL CA: CPT

## 2019-11-26 PROCEDURE — 36415 COLL VENOUS BLD VENIPUNCTURE: CPT

## 2019-11-26 PROCEDURE — 74011250637 HC RX REV CODE- 250/637: Performed by: PHYSICIAN ASSISTANT

## 2019-11-26 PROCEDURE — 74011250636 HC RX REV CODE- 250/636: Performed by: PHYSICIAN ASSISTANT

## 2019-11-26 PROCEDURE — 97161 PT EVAL LOW COMPLEX 20 MIN: CPT

## 2019-11-26 RX ADMIN — LEVOTHYROXINE SODIUM 50 MCG: 50 TABLET ORAL at 06:55

## 2019-11-26 RX ADMIN — Medication 2 G: at 06:17

## 2019-11-26 RX ADMIN — ACETAMINOPHEN 650 MG: 325 TABLET ORAL at 06:17

## 2019-11-26 RX ADMIN — FERROUS SULFATE TAB 325 MG (65 MG ELEMENTAL FE) 325 MG: 325 (65 FE) TAB at 08:44

## 2019-11-26 RX ADMIN — KETOROLAC TROMETHAMINE 15 MG: 30 INJECTION, SOLUTION INTRAMUSCULAR at 09:49

## 2019-11-26 RX ADMIN — ACETAMINOPHEN 650 MG: 325 TABLET ORAL at 00:52

## 2019-11-26 RX ADMIN — OMEPRAZOLE 20 MG: 20 CAPSULE, DELAYED RELEASE ORAL at 09:49

## 2019-11-26 RX ADMIN — DOCUSATE SODIUM 100 MG: 100 CAPSULE, LIQUID FILLED ORAL at 08:43

## 2019-11-26 RX ADMIN — ASPIRIN 81 MG: 81 TABLET, COATED ORAL at 08:43

## 2019-11-26 RX ADMIN — KETOROLAC TROMETHAMINE 15 MG: 30 INJECTION, SOLUTION INTRAMUSCULAR at 04:04

## 2019-11-26 NOTE — PROGRESS NOTES
1943: Patient is very nauseated. Given Scopolamine patch on left ear. Will continue to monitor. 1950: Bedside report received from Phong Gutierres RN. Assumed care of pt at this time. Pt in bed with no signs of distress. Pt left with call light within reach and encouraged to call for assistance. 2018: Head to toe assessment completed at this time  Patient is A&OX4. Pt denies chest pain, SOB or distress. Patient does state she is nauseous. Pt is cooperative. Capillary refill less than 3 seconds. Skin is warm and dry with mepilex dressing on right hip and is clean, dry and intact. Lungs are clear bilaterally. Patient instructed on use and reason for incentive spirometer and able to do 1000 ml. Bowel sounds are active. Abdomen is soft and non-tender. BLANKA & SCDs in place bilaterally. Positive dorsalis pedis pulse, sensation, warm. No tingling or numbness to lower extremities. 18 g needle in the right forearm and is clean, dry and intact. Pain scale explained to patient. Reasons for taking PRN meds explained to patient. Patient instructed to call for prn when needed. Pain level is 3. Patient was oriented to call bell and bed function. Will continue to monitor. Shift summary: Patient had uneventful shift. Patient ambulated with assistance using walker. Pain remained well-controlled with medication. No issues/concerns at this time.

## 2019-11-26 NOTE — PROGRESS NOTES
Progress Note        Patient: Scarlet Ybarra MRN: [id number]  SSN: [id number]    YOB: 1955  Age: 59 y.o. Sex: female      1 Day Post-Op status post Procedure(s) (LRB):  RIGHT TOTAL HIP REPLACEMENT ANTERIOR APPROACH WITH C-ARM (Right)    Admit Date: 2019  Admit Diagnosis: Osteoarthritis of right hip [M16.11]    Subjective:      Doing well. No complaints. No SOB. No Chest Pain. No Nausea or Vomiting. No problems eating or voiding. Objective:        Temp (24hrs), Av °F (36.7 °C), Min:96.8 °F (36 °C), Max:98.9 °F (37.2 °C)    Body mass index is 36.14 kg/m². Patient Vitals for the past 12 hrs:   BP Temp Pulse Resp SpO2   19 0320 98/64 98.9 °F (37.2 °C) 84 14 100 %   19 2351 102/55 98.1 °F (36.7 °C) 80 14 96 %   19 2149 135/88 97.5 °F (36.4 °C) 81 16 100 %   19 2040 111/66 98 °F (36.7 °C) 66 15 100 %   19 1949 134/74 96.8 °F (36 °C) 79 14 100 %     Recent Labs     19  0203   HGB 9.9*   HCT 31.1*      K 4.2      CO2 28   BUN 16   CREA 0.91   *       Physical Exam:  Vital Signs are Stable. No Acute Distress. Alert and Oriented. Negative Homans sign. Toes AROM Full. Neurovascular exam is normal.    Dressing is Clean, Dry, and Intact. Assessment/Plan:     Stable s/p thr  oob with rehab  1.  D/c planning    Continue PT/OT  Discharge Plan: Home    Signed By: Maurice Robles MD     2019

## 2019-11-26 NOTE — HOME CARE
Met with  patient in room. Verified address and telephone numbers. Explained home care services and rountines. Orders noted and arranged. Left contact information .     Xochitl Hunt RN, BSN   [employer]

## 2019-11-26 NOTE — ROUTINE PROCESS
Bedside and Verbal shift change report given to Azam Lizama RN by Robbin Harrison RN. Report included the following information SBAR, Kardex, OR Summary, Intake/Output and MAR.

## 2019-11-26 NOTE — PROGRESS NOTES
Transition of Care (CESAR) Plan:     Chart reviewed, met with pt and visitor in room. Pt states she plans discharge home once she clears PT and has assistance once home. FOC offered, pt chose [facility] [phone] for follow up; referral placed with CMS. Pt has RW for home. CESAR Transportation:   How is patient being transported at discharge? Family/Friend      When? Once cleared by Therapy between 12-2pm     Is transport scheduled? N/A      Follow-up appointment and transportation:   PCP/Specialist?  See AVS for Appointment         Who is transporting to the follow-up appointment? Family/Friend      Is transport for follow up appointment scheduled? N/A    Communication plan (with patient/family): Who is being called? Patient or Next of Kin? Responsible party? Patient      What number(s) is to be used? See Facesheet      What service provider is calling for [facility] services? When are they calling? 24-48 hours following discharge    Readmission Risk? (Green/Low; Yellow/Moderate; Red/High):  Green    Care Management Interventions  PCP Verified by CM:  Yes  Transition of Care Consult (CM Consult): [address]: Yes  Discharge Durable Medical Equipment: No  Physical Therapy Consult: Yes  Occupational Therapy Consult: Yes  Current Support Network: Own Home  Confirm Follow Up Transport: Family  Plan discussed with Pt/Family/Caregiver: Yes  Freedom of Choice Offered: Yes  Discharge Location  Discharge Placement: Home with home health

## 2019-11-26 NOTE — PROGRESS NOTES
Problem: Mobility Impaired (Adult and Pediatric)  Goal: *Acute Goals and Plan of Care (Insert Text)  Description  In 1-7 days pt will be able to perform:  ST.  Bed mobility:  Rolling L to R to L modified independent for positioning. 2.  Supine to sit to supine S with HR for meals. 3.  Sit to stand to sit S with RW in prep for ambulation. LT.  Gait:  Ambulate 150ft S with RW, WBAT, for home/community mobility. 2.  Stair Negotiation:  Ascend/descend >5 steps CGA with HR for home entry. 3.  Activity tolerance: Tolerate up in chair 1-2 hours for ADLs. 4.  Patient/Family Education:  Patient/family to be independent with HEP for follow-up care and safe discharge. 2019 1135 by Giorgio Arteaga PT  Outcome: Resolved/Met  2019 1130 by Giorgio Arteaga PT  Note:   PHYSICAL THERAPY EVALUATION/Discharge    Patient: Ganesh Chandler (29 y.o. female)  Date: 2019  Primary Diagnosis: Osteoarthritis of right hip [M16.11]  Procedure(s) (LRB):  RIGHT TOTAL HIP REPLACEMENT ANTERIOR APPROACH WITH C-ARM (Right) 1 Day Post-Op   Precautions:   Fall, WBAT    ASSESSMENT :  Based on the objective data described below, the patient presents with decreased functional mobility and independence in regard to bed mobility, transfers, gt quality and tolerance, R hip AROM, R hip strength, pain, balance, activity tolerance, stair negotiation and safety due to recent R PRAKASH surgery. Pt rating pain on numerical pain scale 0/10. Pt sitting up in chair upon arrival.  Pt required S for sit<>stand. Pt required vc for safe techniques. Pt able to participate in gt training w/ RW, WBAT, GB and S in hallway w/ antalgic pattern. Pt able to participate in stair training. Pt has met goals for this level of PT and is ready to transition to HHPT. Pt returned to sitting in chair awaiting meal w/ all needs within reach. Nurse Nancy Finengan aware and visitor present. Recommend  [facility] d/c.      Patients rehabilitation potential is considered to be Excellent       PLAN :  Pt has met goals for this level of PT. Ready to transition to [address]. Discharge Recommendations: Home Health  Further Equipment Recommendations for Discharge: N/A     SUBJECTIVE:   Patient stated I just have thigh soreness.     OBJECTIVE DATA SUMMARY:     Past Medical History:   Diagnosis Date    Arrhythmia 2016    H/O     Arthritis     Autoimmune disease ([facility] [address]     H/O  sarcodosis    GERD (gastroesophageal reflux disease)     Osteoarthritis of right hip 11/23/2019    Sleep apnea     mouth guard  instructed to bring DOS    Thyroid disease      Past Surgical History:   Procedure Laterality Date    HX CATARACT REMOVAL      HX CERVICAL LAMINECTOMY      HX GASTRIC BYPASS  2016    sleeve    HX LAP CHOLECYSTECTOMY      HX ORTHOPAEDIC Right     ankle     Barriers to Learning/Limitations: None  Compensate with: visual, verbal, tactile, kinesthetic cues/model  Prior Level of Function/Home Situation:   Home Situation  Home Environment: Private residence  # Steps to Enter: 5  Rails to Enter: Yes  Hand Rails : Bilateral  One/Two Story Residence: Two story  # of Interior Steps: 20  Lift Chair Available: No  Living Alone: No  Support Systems: Family member(s)  Patient Expects to be Discharged to[de-identified] Private residence  Current DME Used/Available at Home: Walker, rolling, Raised toilet seat, Cane, straight  Critical Behavior:  Neurologic State: Alert; Appropriate for age  Orientation Level: Oriented X4  Cognition: Appropriate decision making; Appropriate for age attention/concentration; Appropriate safety awareness; Follows commands  Safety/Judgement: Awareness of environment  Psychosocial  Patient Behaviors: Calm; Cooperative; Talkative  Purposeful Interaction: Yes  Pt Identified Daily Priority: Clinical issues (comment)  Caritas Process: Nurture loving kindness;Establish trust;Teaching/learning; Attend basic human needs;Create healing environment  Caring Interventions: Reassure  Reassure: Informing; Therapeutic listening  Skin Condition/Temp: Dry;Warm  Skin Integrity: Incision (comment)(R hip)  Skin Integumentary  Skin Color: Appropriate for ethnicity  Skin Condition/Temp: Dry;Warm  Skin Integrity: Incision (comment)(R hip)  Turgor: Non-tenting  Hair Growth: Present  Varicosities: Absent  Strength:    Strength: Generally decreased, functional  Tone & Sensation:   Tone: Normal  Sensation: Intact  Range Of Motion:  AROM: Generally decreased, functional  Functional Mobility:  Bed Mobility:  Scooting: Supervision  Transfers:  Sit to Stand: Supervision  Stand to Sit: Supervision  Balance:   Sitting: Intact  Standing: Intact; With support  Ambulation/Gait Training:  Distance (ft): 150 Feet (ft)  Assistive Device: Walker, rolling  Ambulation - Level of Assistance: Supervision  Gait Abnormalities: Antalgic;Decreased step clearance  Right Side Weight Bearing: As tolerated  Base of Support: Shift to left  Stance: Right decreased  Speed/Renee: Pace decreased (<100 feet/min)  Step Length: Left shortened;Right shortened  Swing Pattern: Left asymmetrical;Right asymmetrical  Interventions: Safety awareness training;Verbal cues; Tactile cues; Visual/Demos  Therapeutic Exercises:   HEP written copy issued to pt per MD protocol. Reviewed HEP for pt competency. Pain:  Pain Scale 1: Numeric (0 - 10)  Pain Intensity 1: 0  Pain Location 1: Hip  Pain Orientation 1: Right  Pain Description 1: Aching  Pain Intervention(s) 1: Declines  Activity Tolerance:   Fair   Please refer to the flowsheet for vital signs taken during this treatment.   After treatment:   [x]         Patient left in no apparent distress sitting up in chair  []         Patient left in no apparent distress in bed  [x]         Call bell left within reach  [x]         Nursing notified  []         Caregiver present  []         Bed alarm activated    COMMUNICATION/EDUCATION:   [x]         Fall prevention education was provided and the patient/caregiver indicated understanding. [x]         Patient/family have participated as able in goal setting and plan of care. [x]         Patient/family agree to work toward stated goals and plan of care. []         Patient understands intent and goals of therapy, but is neutral about his/her participation. []         Patient is unable to participate in goal setting and plan of care.     Thank you for this referral.  Jessica Dangelo, PT   Time Calculation: 14 mins       Eval Complexity: History: HIGH Complexity :3+ comorbidities / personal factors will impact the outcome/ POC Exam:MEDIUM Complexity : 3 Standardized tests and measures addressing body structure, function, activity limitation and / or participation in recreation  Presentation: LOW Complexity : Stable, uncomplicated  Clinical Decision Making:Low Complexity    Overall Complexity:LOW

## 2019-11-26 NOTE — PROGRESS NOTES
Problem: Falls - Risk of  Goal: *Absence of Falls  Description  Document Rory Villagomezwater Fall Risk and appropriate interventions in the flowsheet. 11/26/2019 0124 by Juany Mcleod RN  Outcome: Progressing Towards Goal  Note: Fall Risk Interventions:  Mobility Interventions: Patient to call before getting OOB, PT Consult for mobility concerns, PT Consult for assist device competence, Utilize walker, cane, or other assistive device    Medication Interventions: Patient to call before getting OOB, Teach patient to arise slowly    Elimination Interventions:  Toilet paper/wipes in reach, Patient to call for help with toileting needs, Call light in reach    History of Falls Interventions: Consult care management for discharge planning

## 2019-11-26 NOTE — ROUTINE PROCESS
Bedside shift change report given to Roxanne Briscoe RN (oncoming nurse) by Jose M BELL RN (offgoing nurse). Report included the following information SBAR, Kardex and MAR.

## 2019-11-26 NOTE — ROUTINE PROCESS
5457- Bedside report received from Za Malone. Patient in chair at this time. Pain 0/10. Plan of care for the day addressed with the patient. 6230 - Patient in bed at this time. A/O x 4. IV to right hand  intact and patent. TEDS and SCDS to BLE. Silver mepilex dressing to right hip CDI. Patient denies numbness/tingling. Pedal and radial pulses palpable. Lungs clear. Bowel sounds active to all quadrants. Patient able to get to 1700 on the incentive spirometer. Pain 0/10.  
 
1150-Discharge instructions reviewed with patient at this time. Opportunity for questions and clarification was provided. Patient has verbalized understanding. Patient was provided with care notes to include side effects of RX's. Arm bands removed and shredded. AVS reviewed with Feli Brunson RN RN. IV removed. Dressing CDI.

## 2019-11-27 ENCOUNTER — HOME CARE VISIT (OUTPATIENT)
Dept: SCHEDULING | Facility: HOME HEALTH | Age: 64
End: 2019-11-27
Payer: COMMERCIAL

## 2019-11-27 VITALS
DIASTOLIC BLOOD PRESSURE: 58 MMHG | OXYGEN SATURATION: 98 % | TEMPERATURE: 99.2 F | HEART RATE: 72 BPM | SYSTOLIC BLOOD PRESSURE: 105 MMHG

## 2019-11-27 PROCEDURE — 400013 HH SOC

## 2019-11-27 PROCEDURE — G0151 HHCP-SERV OF PT,EA 15 MIN: HCPCS

## 2019-11-28 ENCOUNTER — HOME CARE VISIT (OUTPATIENT)
Dept: SCHEDULING | Facility: HOME HEALTH | Age: 64
End: 2019-11-28
Payer: COMMERCIAL

## 2019-11-28 VITALS
TEMPERATURE: 96.5 F | OXYGEN SATURATION: 98 % | DIASTOLIC BLOOD PRESSURE: 74 MMHG | SYSTOLIC BLOOD PRESSURE: 116 MMHG | HEART RATE: 91 BPM | RESPIRATION RATE: 14 BRPM

## 2019-11-28 PROCEDURE — G0299 HHS/HOSPICE OF RN EA 15 MIN: HCPCS

## 2019-11-29 ENCOUNTER — HOME CARE VISIT (OUTPATIENT)
Dept: SCHEDULING | Facility: HOME HEALTH | Age: 64
End: 2019-11-29
Payer: COMMERCIAL

## 2019-11-29 PROCEDURE — G0157 HHC PT ASSISTANT EA 15: HCPCS

## 2019-12-01 VITALS
HEART RATE: 87 BPM | DIASTOLIC BLOOD PRESSURE: 70 MMHG | TEMPERATURE: 98.4 F | SYSTOLIC BLOOD PRESSURE: 134 MMHG | OXYGEN SATURATION: 98 %

## 2019-12-02 ENCOUNTER — HOME CARE VISIT (OUTPATIENT)
Dept: SCHEDULING | Facility: HOME HEALTH | Age: 64
End: 2019-12-02
Payer: COMMERCIAL

## 2019-12-02 VITALS
HEART RATE: 88 BPM | TEMPERATURE: 98.9 F | SYSTOLIC BLOOD PRESSURE: 114 MMHG | DIASTOLIC BLOOD PRESSURE: 60 MMHG | OXYGEN SATURATION: 97 %

## 2019-12-02 PROCEDURE — G0299 HHS/HOSPICE OF RN EA 15 MIN: HCPCS

## 2019-12-02 PROCEDURE — G0157 HHC PT ASSISTANT EA 15: HCPCS

## 2019-12-02 PROCEDURE — A6213 FOAM DRG >16<=48 SQ IN W/BDR: HCPCS

## 2019-12-03 VITALS
RESPIRATION RATE: 16 BRPM | TEMPERATURE: 98.1 F | HEART RATE: 86 BPM | OXYGEN SATURATION: 98 % | DIASTOLIC BLOOD PRESSURE: 82 MMHG | SYSTOLIC BLOOD PRESSURE: 142 MMHG

## 2019-12-04 ENCOUNTER — HOME CARE VISIT (OUTPATIENT)
Dept: SCHEDULING | Facility: HOME HEALTH | Age: 64
End: 2019-12-04
Payer: COMMERCIAL

## 2019-12-04 PROCEDURE — G0157 HHC PT ASSISTANT EA 15: HCPCS

## 2019-12-05 ENCOUNTER — HOME CARE VISIT (OUTPATIENT)
Dept: HOME HEALTH SERVICES | Facility: HOME HEALTH | Age: 64
End: 2019-12-05
Payer: COMMERCIAL

## 2019-12-05 VITALS
TEMPERATURE: 98.5 F | DIASTOLIC BLOOD PRESSURE: 70 MMHG | OXYGEN SATURATION: 93 % | HEART RATE: 86 BPM | SYSTOLIC BLOOD PRESSURE: 112 MMHG

## 2019-12-06 ENCOUNTER — HOME CARE VISIT (OUTPATIENT)
Dept: SCHEDULING | Facility: HOME HEALTH | Age: 64
End: 2019-12-06
Payer: COMMERCIAL

## 2019-12-06 VITALS
HEART RATE: 70 BPM | SYSTOLIC BLOOD PRESSURE: 110 MMHG | DIASTOLIC BLOOD PRESSURE: 76 MMHG | TEMPERATURE: 98.2 F | OXYGEN SATURATION: 95 %

## 2019-12-06 VITALS
OXYGEN SATURATION: 95 % | DIASTOLIC BLOOD PRESSURE: 76 MMHG | RESPIRATION RATE: 14 BRPM | SYSTOLIC BLOOD PRESSURE: 110 MMHG | HEART RATE: 70 BPM | TEMPERATURE: 98.1 F

## 2019-12-06 PROCEDURE — G0299 HHS/HOSPICE OF RN EA 15 MIN: HCPCS

## 2019-12-06 PROCEDURE — G0157 HHC PT ASSISTANT EA 15: HCPCS

## 2019-12-09 ENCOUNTER — HOME CARE VISIT (OUTPATIENT)
Dept: SCHEDULING | Facility: HOME HEALTH | Age: 64
End: 2019-12-09
Payer: COMMERCIAL

## 2019-12-09 PROCEDURE — G0157 HHC PT ASSISTANT EA 15: HCPCS

## 2019-12-10 ENCOUNTER — HOME CARE VISIT (OUTPATIENT)
Dept: SCHEDULING | Facility: HOME HEALTH | Age: 64
End: 2019-12-10
Payer: COMMERCIAL

## 2019-12-10 VITALS
HEART RATE: 83 BPM | DIASTOLIC BLOOD PRESSURE: 78 MMHG | SYSTOLIC BLOOD PRESSURE: 112 MMHG | OXYGEN SATURATION: 94 % | TEMPERATURE: 98.6 F

## 2019-12-10 VITALS
SYSTOLIC BLOOD PRESSURE: 124 MMHG | TEMPERATURE: 97.8 F | HEART RATE: 84 BPM | DIASTOLIC BLOOD PRESSURE: 62 MMHG | OXYGEN SATURATION: 96 %

## 2019-12-10 PROCEDURE — G0151 HHCP-SERV OF PT,EA 15 MIN: HCPCS

## 2019-12-17 ENCOUNTER — HOSPITAL ENCOUNTER (OUTPATIENT)
Dept: PHYSICAL THERAPY | Age: 64
Discharge: HOME | End: 2019-12-17
Payer: COMMERCIAL

## 2019-12-17 PROCEDURE — 97110 THERAPEUTIC EXERCISES: CPT | Performed by: PHYSICAL THERAPIST

## 2019-12-17 PROCEDURE — 97162 PT EVAL MOD COMPLEX 30 MIN: CPT | Performed by: PHYSICAL THERAPIST

## 2019-12-17 NOTE — PROGRESS NOTES
PT  EVAL AND TREATMENT    Patient Name: Manolo Spencer  Date:2019  : 1955  [x]  Patient  Verified  Payor: Td Fletcher / Plan: [place] [employer] PPO / Product Type: PPO /    In time:321  Out time:411 pm   Total Treatment Time (min): 50 min   Total Timed Codes (min): 50  1:1 Treatment Time ( only): 50   Visit #: 1 of 8-10     Treatment Area: Acute right hip pain [M25.551]  Other acute postprocedural pain [G89.18]  Pain in: 0 rest 1-2 walking, groin    Objective evaluation:  Physical Therapy Evaluation- Hip  Pt is being seen s/p R PRAKASH AA, DOS 19. 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 Pt hasn't been treated in PT prior to sx. MRI reveal major Degenerative changes. Pt currently using SPC for ambulation. Stairs in the home, going step to pattern at this time. Functional limitations: prolonged walking, decreased endurance. Pt walking 5-7mi/day prior to sx. Pt currently going to gym and doing 15 min on bicycle, 5 min on TM. Pt had injection in L medial meniscus during sx and it seems to be better. Pain ranges form 0-3 (ache), Pt using ice 2x/day and celebrex.      Posture: level pelvis , WNL    Gait:  [] Normal    [] Abnormal    [x] Antalgic    [] NWB      Describe:SPC  with minimal antalgia and decreased WB on R.      ROM/Strength        AROM                     PROM        Strength (1-5)  Hip Left Right Left Right Left Right   Flexion WFL 110deg  110deg 4+ 4-   Extension  10deg  -10deg 3 3   Abduction  30deg  34deg Unable to test  3   Adduction  WNL   WNL 4+ 4+   ER 55 30       IR 30 30       Knee Left Right Left Right Left Right   Extension 0 0       Flexion [facility] W[place]          Flexibility:   Hamstrings:    (L) Tightness= [] WNL   [x] Min   [] Mod   [] Severe    (R) Tightness= [] WNL   [x] Min   [] Mod   [] Severe  Quadriceps:    (L) Tightness= [] WNL   [x] Min   [] Mod   [] Severe    (R) Tightness= [] WNL   [] Min   [x] Mod   [] Severe  Gastroc:    (L) Tightness= [] WNL   [x] Min   [] Mod   [] Severe    (R) Tightness= [] WNL   [x] Min   [] Mod   [] Severe                                  Palpation  [] Min  [x] Mod  [] Severe    Location:TTP over lower incision, Lateral ITB    Optional Tests  + ELy on R   Right LE :slightly shorter at malleoli   Incision has a small opening at top and hardened area at distal end. No drainage noted. Justification for Eval Code Complexity:  Patient History : na  Examination see exam as above   Clinical Presentation: evolving  Clinical Decision Making : FOTO : 51 /100    Manual: 8 min PROM       Patient Education: [x] Established HEP    [x] POT (minutes) :15 min  HEP    Pain Level (0-10 scale) post treatment: 0-1  ASSESSMENT  [x]  See Plan of Care    PLAN  [x]  Upgrade activities as tolerated     [x] Other:_ POC  2x/wk for 8-12 treatments.      Matt De La Torre, PT 12/17/2019  9:31 AM

## 2019-12-17 NOTE — PROGRESS NOTES
[facility] PHYSICAL THERAPY AT [address] [facility]  Phone: [phone] Fax: [phone] / [address] [address] PHYSICAL THERAPY SERVICES  Patient Name: Scarlet Ybarra : 1955   Medical   Diagnosis: Acute right hip pain [M25.551]  Other acute postprocedural pain [G89.18] Treatment Diagnosis: R THR   Onset Date: 19     Referral Source: Jcarlos Parry MD Start of Care [facility]): 2019   Prior Hospitalization: See medical history Provider #: [id number]   Prior Level of Function: Indep   Comorbidities: na   Medications: Verified on Patient Summary List   The Plan of Care and following information is based on the information from the initial evaluation.   ========================================================================  Assessment / key information:  Patient is a 59 y.o. female who presents to [facility] with Dx of [facility]. Pt is being seen s/p R PRAKASH UNDER[place]OD, DOS 19. 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 Pt hasn't been treated in PT prior to sx. MRI reveal major Degenerative changes. Pt currently using SPC for ambulation. Stairs in the home, going step to pattern at this time. Functional limitations: prolonged walking, decreased endurance. Pt walking 5-7mi/day prior to sx. Pt currently going to gym and doing 15 min on bicycle, 5 min on TM. Pt had injection in L medial meniscus during sx and it seems to be better. Pain ranges form 0-3 (ache), Pt using ice 2x/day and celebrex.    Posture: level pelvis , WNL     Gait:                []? Normal    []? Abnormal    [x]? Antalgic    []?  NWB                            Describe:SPC  with minimal antalgia and decreased WB on R.                ROM/Strength                   AROM                          PROM                       Strength (1-5)  Hip Left Right Left Right Left Right   Flexion WFL 110deg   110deg 4+ 4-   Extension   10deg   -10deg 3 3   Abduction   30deg   34deg Unable to test  3   Adduction   WNL    WNL 4+ 4+   ER 55 30           IR 30 30           Knee Left Right Left Right Left Right   Extension 0 0           Flexion [facility]                       Flexibility:   Hamstrings:               (L) Tightness= []? WNL   [x]? Min   []? Mod   []? Severe                        (R) Tightness= []? WNL   [x]? Min   []? Mod   []? Severe  Quadriceps:               (L) Tightness= []? WNL   [x]? Min   []? Mod   []? Severe                        (R) Tightness= []? WNL   []? Min   [x]? Mod   []? Severe  Gastroc:               (L) Tightness= []? WNL   [x]? Min   []? Mod   []? Severe                        (R) Tightness= []? WNL   [x]? Min   []? Mod   []? Severe                                                                                         Palpation  []? Min  [x]? Mod  []? Severe    Location:TTP over lower incision, Lateral ITB   Optional Tests  + ELy on R   Right LE :slightly shorter at malleoli     Incision has a small opening at top and hardened area at distal end. No drainage noted. Patient scored 51 on FOTO indicating decreased functional activity level and QOL. A home exercise program was demonstrated and provided to address the above objective and functional deficits.  Patient can benefit from PT interventions to improve AROM, strength, flexibility decrease pain and antalgia, to facilitate ADLs & overall functional status.   ========================================================================  Eval Complexity: History: LOW Complexity : Zero comorbidities / personal factors that will impact the outcome / POCExam:MEDIUM Complexity : 3 Standardized tests and measures addressing body structure, function, activity limitation and / or participation in recreation  Presentation: MEDIUM Complexity : Evolving with changing characteristics  Clinical Decision Making:MEDIUM Complexity : FOTO score of 26-74Overall Complexity:MEDIUM  Problem List: pain affecting function, decrease ROM, decrease strength, edema affecting function, impaired gait/ balance, decrease ADL/ functional abilitiies, decrease activity tolerance, decrease flexibility/ joint mobility and decrease transfer abilities   Treatment Plan may include any combination of the following: Therapeutic exercise, Therapeutic activities, Neuromuscular re-education, Physical agent/modality, Gait/balance training, Manual therapy, Aquatic therapy, Patient education, Self Care training, Functional mobility training, Home safety training and Stair training  Patient / Family readiness to learn indicated by: asking questions, trying to perform skills and interest  Persons(s) to be included in education: patient (P)  Barriers to Learning/Limitations: None  Measures taken:    Patient Goal (s): \"walk without cane\"   Patient self reported health status: excellent  Rehabilitation Potential: excellent   Short Term Goals: To be accomplished in  2  weeks:  1. Patient will be compliant with HEP for sx management to address the above listed deficits. [place] Long Term Goals: To be accomplished in  8-12  treatments:  1. Patient to be independent & compliant with HEP in preparation for D/C.  2. Patient to increase FOTO score to 67 indicating improved functional abilities and QOL. 3. Patient to increase strength in R hip to 5/5 to facilitate normalized gait. 4. Patient able to ambulate with no antalgia with no AD for 1000 ft.  5. Patient able to negotiate 4 therapy steps with reciprocal pattern.      Frequency / Duration:   Patient to be seen  2  times per week for 8-12  treatments:  Patient / Caregiver education and instruction: activity modification and exercises  Therapist Signature: David January, PT Date: 63/57/9112   Certification Period:  Time: 9:32 AM   ========================================================================  I certify that the above Physical Therapy Services are being furnished while the patient is under my care. I agree with the treatment plan and certify that this therapy is necessary. Physician Signature:        Date:       Time:   Please sign and return to [facility] or you may fax the signed copy to [phone]. Thank you.     DID YOU CHECK ALLERGIES? y

## 2019-12-20 ENCOUNTER — APPOINTMENT (OUTPATIENT)
Dept: PHYSICAL THERAPY | Age: 64
End: 2019-12-20
Payer: COMMERCIAL

## 2019-12-23 ENCOUNTER — HOSPITAL ENCOUNTER (OUTPATIENT)
Dept: PHYSICAL THERAPY | Age: 64
Discharge: HOME | End: 2019-12-23
Payer: COMMERCIAL

## 2019-12-23 PROCEDURE — 97110 THERAPEUTIC EXERCISES: CPT

## 2019-12-23 PROCEDURE — 97140 MANUAL THERAPY 1/> REGIONS: CPT

## 2019-12-23 NOTE — PROGRESS NOTES
PT DAILY TREATMENT NOTE     Patient Name: Evelyne Mathew  Date:2019  : 1955  [x]  Patient  Verified  Payor: Miguel Ángel Armstrong / Plan: [employer] PPO / Product Type: PPO /    In time:11:29  Out time:12:15  Total Treatment Time (min): 46  Total Timed Codes (min): 46  1:1 Treatment Time (min): 46   Visit #: 2 of     Treatment Area: Acute right hip pain [M25.551]  Other acute postprocedural pain [G89.18]    SUBJECTIVE  Pain Level (0-10 scale): 0 at rest, 1-2 walking  Any medication changes, allergies to medications, adverse drug reactions, diagnosis change, or new procedure performed?: [x] No    [] Yes (see summary sheet for update)  Subjective functional status/changes:   [] No changes reported  trying to walk without the limp so i'm using my cane. There is a little pull in the hip with walking.      OBJECTIVE  Modality rationale: decrease edema, decrease inflammation and decrease pain to improve the patients ability to ambulate, full work duties    Min Type Additional Details   PD [x]  Ice     []  heat  []  Ice massage Position:  Location:    []  Vasopneumatic Device Pressure:       [] lo [] med [] hi   Temperature: [] lo [] med [] hi   [x] Skin assessment post-treatment:  [x]intact []redness- no adverse reaction       []redness - adverse reaction:       30 min Therapeutic Exercise:  [x] See flow sheet : initiate HS stretch; initiate all TE see flow sheet   Rationale: increase ROM and increase strength to improve the patients ability to ascend stairs, aDLs, mobility     10 min Manual Therapy:  PROM R hip IR and light ER in prone and supine at 90; hip flexion, abduction, posterior hip stretch, HS stretch    Rationale: decrease pain, increase ROM, increase tissue extensibility and decrease trigger points to improve mobility for ADLs, work, ambulation     6 min Gait Training:  Tandem gait to improve wide ZULEIMA on the R,    Rationale: improve gait patterning for return to PLOF           x min Patient Education: [x] Review HEP    [] Progressed/Changed HEP based on:   [] positioning   [] body mechanics   [] transfers   [] heat/ice application        Other Objective/Functional Measures:    Pt requires mirror assist for form with squats and 3 way hip  Fatigue noted with SLS on the R during 3 way hip  Comp Trendelenburg noted with side stepping, mod improved after VC     Pain Level (0-10 scale) post treatment: 0    ASSESSMENT/Changes in Function: first session since IE, progressing appropriately. Pt demo's min antalgic gait on the R and min asymmetries with transfers. Great PROm of the R hip     Patient will continue to benefit from skilled PT services to modify and progress therapeutic interventions, address functional mobility deficits, address ROM deficits, address strength deficits, analyze and address soft tissue restrictions, analyze and cue movement patterns, analyze and modify body mechanics/ergonomics, assess and modify postural abnormalities, address imbalance/dizziness and instruct in home and community integration to attain remaining goals. []  See Plan of Care  []  See progress note/recertification  []  See Discharge Summary         Progress towards goals / Updated goals: · Short Term Goals: To be accomplished in  2  weeks:  1. Patient will be compliant with HEP for sx management to address the above listed deficits. . Goal progressing, pt notes compliance  (12/23/19)     · Long Term Goals: To be accomplished in  8-12  treatments:  1. Patient to be independent & compliant with HEP in preparation for D/C.  2. Patient to increase FOTO score to 67 indicating improved functional abilities and QOL. 3. Patient to increase strength in R hip to 5/5 to facilitate normalized gait. Goal progressing with initiation of TE today (12/23/19)  4. Patient able to ambulate with no antalgia with no AD for 1000 ft. Improved with min antalgic pattern, increased ZULEIMA on the R (12/23/19)  5.  Patient able to negotiate 4 therapy steps with reciprocal pattern.       PLAN  [x]  Upgrade activities as tolerated     [x]  Continue plan of care  []  Update interventions per flow sheet       []  Discharge due to:_  []  Other:_      Humble Higgins, PT 12/23/2019  8:08 AM    Future Appointments   Date Time Provider Shahriar Moran   12/23/2019 11:30 AM Lissette Haley, PT [facility]   12/31/2019  9:00 AM [facility]   1/2/2020  3:30 PM Damaris Hutchinson PT [facility]   1/6/2020  4:30 PM [facility]   1/8/2020  4:30 PM [facility]   1/13/2020  4:30 PM [facility]   1/15/2020  4:30 PM Trey Lafle[facility]

## 2019-12-31 ENCOUNTER — HOSPITAL ENCOUNTER (OUTPATIENT)
Dept: PHYSICAL THERAPY | Age: 64
Discharge: HOME | End: 2019-12-31
Payer: COMMERCIAL

## 2019-12-31 PROCEDURE — 97116 GAIT TRAINING THERAPY: CPT

## 2019-12-31 PROCEDURE — 97140 MANUAL THERAPY 1/> REGIONS: CPT

## 2019-12-31 PROCEDURE — 97110 THERAPEUTIC EXERCISES: CPT

## 2019-12-31 NOTE — PROGRESS NOTES
PT DAILY TREATMENT NOTE     Patient Name: Estela Boyer  Date:2019  : 1955  [x]  Patient  Verified  Payor: Hasmukh Mckay / Plan: [place] [employer] [place] / Product Type: PPO /    In time: 9:00 am           Out time: 10:04 am  Total Treatment Time (min): 64  Visit #: 3 of     Treatment Area: Acute right hip pain [M25.551]  Other acute postprocedural pain [G89.18]    SUBJECTIVE  Pain Level (0-10 scale): 0 at rest, 1-2 walking  Any medication changes, allergies to medications, adverse drug reactions, diagnosis change, or new procedure performed?: [x] No    [] Yes (see summary sheet for update)  Subjective functional status/changes:   [] No changes reported  \"I haven't had to use the cane at home anymore. I also have been working on bending my knee more when I am lying on my stomach and it's getting better. \"     OBJECTIVE  Modality rationale: decrease edema, decrease inflammation and decrease pain to improve the patients ability to ambulate, full work duties    Min Type Additional Details   10 [x]  Ice     []  heat  []  Ice massage Position: seated per pt preference  Location: (R) anterior hip    []  Vasopneumatic Device Pressure:       [] lo [] med [] hi   Temperature: [] lo [] med [] hi   [x] Skin assessment post-treatment:  [x]intact []redness- no adverse reaction       []redness - adverse reaction:       30 min Therapeutic Exercise:  [x] See flow sheet: progressed hip x 3 and bridging with YTB   Rationale: increase ROM and increase strength to improve the patients ability to ascend stairs, aDLs, mobility     16 min Manual Therapy:  PROM R hip IR and light ER in prone and supine at 90; hip flexion, abduction, posterior hip stretch, HS stretch; DTM to glut med    Rationale: decrease pain, increase ROM, increase tissue extensibility and decrease trigger points to improve mobility for ADLs, work, ambulation     8 min Gait Training:  amb with mirror assist 6 x 45 feet req SBA and VCs for glut setting during stance phase   Rationale: improve gait patterning for return to PLOF           X min Patient Education: [x] Review HEP       Other Objective/Functional Measures:   Patient notes ability to ascend descend shorter steps (apprx 4-6 inches height) reciprocally. Pain Level (0-10 scale) post treatment: 0    ASSESSMENT/Changes in Function:   Improved gait quality in clinic with pt able to avoid hip circumduction and excessive toe out during swing phase of gait with use of shorter stride length; (R) modified Trendelenburg gait continues improved with mirror assist. Improved tolerance to prone lying. (+) right DENISHA, but improved from IE at approx 100 deg knee flexion. Patient will continue to benefit from skilled PT services to modify and progress therapeutic interventions, address functional mobility deficits, address ROM deficits, address strength deficits, analyze and address soft tissue restrictions, analyze and cue movement patterns, analyze and modify body mechanics/ergonomics, assess and modify postural abnormalities, address imbalance/dizziness and instruct in home and community integration to attain remaining goals. []  See Plan of Care  []  See progress note/recertification  []  See Discharge Summary         Progress towards goals / Updated goals: · Short Term Goals: To be accomplished in  2  weeks:  1. Patient will be compliant with HEP for sx management to address the above listed deficits. . Goal progressing, pt notes compliance  (12/23/19)     · Long Term Goals: To be accomplished in  8-12  treatments:  1. Patient to be independent & compliant with HEP in preparation for D/C.  2. Patient to increase FOTO score to 67 indicating improved functional abilities and QOL. 3. Patient to increase strength in R hip to 5/5 to facilitate normalized gait. Goal progressing with initiation of TE today (12/23/19)  4. Patient able to ambulate with no antalgia with no AD for 1000 ft.  Improved with min antalgic pattern, increased ZULEIMA on the R (12/23/19)  5. Patient able to negotiate 4 therapy steps with reciprocal pattern.   -Goal progressing; pt notes improved ability to negotiate short steps normally (12/31/19)    PLAN  [x]  Upgrade activities as tolerated     [x]  Continue plan of care  []  Update interventions per flow sheet       []  Discharge due to:_  [x]  Other: cont to progress glut strengthening      Jennifer Lehman PTA 12/31/2019    Future Appointments   Date Time Provider Shahriar Moran   1/2/2020  3:30 PM Swapna Adrian, PT [facility]   1/6/2020  4:30 PM [facility]   1/8/2020  4:30 PM [facility]   1/13/2020  4:30 PM [facility]   1/15/2020  4:30 PM Ilir Willis PTA [facility]

## 2020-01-02 ENCOUNTER — HOSPITAL ENCOUNTER (OUTPATIENT)
Dept: PHYSICAL THERAPY | Age: 65
Discharge: HOME | End: 2020-01-02
Payer: COMMERCIAL

## 2020-01-02 PROCEDURE — 97110 THERAPEUTIC EXERCISES: CPT

## 2020-01-02 PROCEDURE — 97140 MANUAL THERAPY 1/> REGIONS: CPT

## 2020-01-02 NOTE — PROGRESS NOTES
PT DAILY TREATMENT NOTE     Patient Name: Rosa Elena Camilo  Date:2020  : 1955  [x]  Patient  Verified  Payor: Bere Gatica / Plan: [place] [employer] / Product Type: PPO /    In time:332         Out time:430  Total Treatment Time (min): 58  Visit #: 4 of     Treatment Area: Acute right hip pain [M25.551]  Other acute postprocedural pain [G89.18]    SUBJECTIVE  Pain Level (0-10 scale): 2-3  Any medication changes, allergies to medications, adverse drug reactions, diagnosis change, or new procedure performed?: [x] No    [] Yes (see summary sheet for update)  Subjective functional status/changes:   [] No changes reported  Patient reports nearly doubling her steps over the last few days and having increased residual soreness.      OBJECTIVE  Modality rationale: decrease edema, decrease inflammation and decrease pain to improve the patients ability to ambulate, full work duties    Min Type Additional Details   10 [x]  Ice     []  heat  []  Ice massage Position: seated per pt preference  Location: (R) anterior hip    []  Vasopneumatic Device Pressure:       [] lo [] med [] hi   Temperature: [] lo [] med [] hi   [x] Skin assessment post-treatment:  [x]intact []redness- no adverse reaction       []redness - adverse reaction:       38 min Therapeutic Exercise:  [x] See flow sheet: assess on bike    Rationale: increase ROM and increase strength to improve the patients ability to ascend stairs, aDLs, mobility     10 min Manual Therapy: PROM / manual hip ADD stretch, roller to hip ADD, PROM hip flexion, SL hip extension PROM, prone quad stretch    Rationale: decrease pain, increase ROM, increase tissue extensibility and decrease trigger points to improve mobility for ADLs, work, ambulation     0 min Gait Training: NT   Rationale: improve gait patterning for return to PLOF           X min Patient Education: [x] Review HEP       Other Objective/Functional Measures:   PRE progression - added TB HS curls  Balance - able to perform side stepping outside II bars with no LOB      Pain Level (0-10 scale) post treatment: 0/10    ASSESSMENT/Changes in Function:   Patient reports m soreness after DTM treatment last session for approx 1 day, therefore held DTM. Significant tightness of hip ADD address with MT today. Patient tolerated all therex without adverse reaction and is progressing well with gait quality. Patient will continue to benefit from skilled PT services to modify and progress therapeutic interventions, address functional mobility deficits, address ROM deficits, address strength deficits, analyze and address soft tissue restrictions, analyze and cue movement patterns, analyze and modify body mechanics/ergonomics, assess and modify postural abnormalities, address imbalance/dizziness and instruct in home and community integration to attain remaining goals. [x]  See Plan of Care  []  See progress note/recertification  []  See Discharge Summary         Progress towards goals / Updated goals: · Short Term Goals: To be accomplished in  2  weeks:  1. Patient will be compliant with HEP for sx management to address the above listed deficits. . Goal progressing, pt notes compliance  (12/23/19), cont 1/2/2020     · Long Term Goals: To be accomplished in  8-12  treatments:  1. Patient to be independent & compliant with HEP in preparation for D/C.  2. Patient to increase FOTO score to 67 indicating improved functional abilities and QOL. 3. Patient to increase strength in R hip to 5/5 to facilitate normalized gait. Goal progressing with initiation of TE today (12/23/19)  4. Patient able to ambulate with no antalgia with no AD for 1000 ft. Improved with min antalgic pattern, increased ZULEIMA on the R (12/23/19)  5. Patient able to negotiate 4 therapy steps with reciprocal pattern.   -Goal progressing; pt notes improved ability to negotiate short steps normally (12/31/19)    PLAN  [x]  Upgrade activities as tolerated     [x]  Continue plan of care  []  Update interventions per flow sheet       []  Discharge due to:_   [x]  Other:add SLR supine x 3 NV to progress LTG 3 and [id number], PT 1/2/2020    Future Appointments   Date Time Provider Shahriar Moran   1/2/2020  3:30 PM Neeraj Zurita, PT [facility]   1/6/2020  4:30 PM [facility]   1/8/2020  4:30 PM [facility]   1/13/2020  4:30 PM [facility]   1/15/2020  4:30 PM Michael Alston PTA [facility]

## 2020-01-06 ENCOUNTER — HOSPITAL ENCOUNTER (OUTPATIENT)
Dept: PHYSICAL THERAPY | Age: 65
Discharge: HOME | End: 2020-01-06
Payer: COMMERCIAL

## 2020-01-06 PROCEDURE — 97110 THERAPEUTIC EXERCISES: CPT

## 2020-01-06 PROCEDURE — 97140 MANUAL THERAPY 1/> REGIONS: CPT

## 2020-01-06 NOTE — PROGRESS NOTES
PT DAILY TREATMENT NOTE     Patient Name: Adan Rodriguez  Date:2020  : 1955  [x]  Patient  Verified  Payor: Anai Edmond / Plan: [employer] / Product Type: PPO /    In time: 4:34 pm            Out time: 5:28 pm  Total Treatment Time (min): 54  Visit #: 5 of     Treatment Area: Acute right hip pain [M25.551]  Other acute postprocedural pain [G89.18]    SUBJECTIVE  Pain Level (0-10 scale): 1  Any medication changes, allergies to medications, adverse drug reactions, diagnosis change, or new procedure performed?: [x] No    [] Yes (see summary sheet for update)  Subjective functional status/changes:   [] No changes reported  Patient notes inc comfort with walking 10,000 steps daily.  Patient does state she has most difficulty lifting her leg up, as in out of the bed,    OBJECTIVE  Modality rationale: decrease edema, decrease inflammation and decrease pain to improve the patients ability to ambulate, full work duties    Min Type Additional Details   10 [x]  Ice     []  heat  []  Ice massage Position: seated per pt preference  Location: (R) anterior hip    []  Vasopneumatic Device Pressure:       [] lo [] med [] hi   Temperature: [] lo [] med [] hi   [x] Skin assessment post-treatment:  [x]intact []redness- no adverse reaction       []redness - adverse reaction:       33 min Therapeutic Exercise:  [x] See flow sheet: planned to add SLR, but pt notes performing this at home already   Rationale: increase ROM and increase strength to improve the patients ability to ascend stairs, aDLs, mobility     11 min Manual Therapy: (R) hip PROM / manual hip ADD stretch, roller to hip ADD, PROM hip flexion, SL hip extension PROM, prone quad stretch    Rationale: decrease pain, increase ROM, increase tissue extensibility and decrease trigger points to improve mobility for ADLs, work, ambulation     0 min Gait Training: NT   Rationale: improve gait patterning for return to PLOF           X min Patient Education: [x] Review HEP; pt encouraged to cont SLR at home with reduced ROM per comfort       Other Objective/Functional Measures:        Pain Level (0-10 scale) post treatment: 0    ASSESSMENT/Changes in Function:   Improved gait quality today in clinic; pt notes she is increasing tolerance to ambulating independently. Cont's with right adductor yandy and longus stiffness contributing to groin pain, but pt notes addressing with SLR exercise at home to inc anterior hip strength (short ROM with approx 2 inch lift). Patient will continue to benefit from skilled PT services to modify and progress therapeutic interventions, address functional mobility deficits, address ROM deficits, address strength deficits, analyze and address soft tissue restrictions, analyze and cue movement patterns, analyze and modify body mechanics/ergonomics, assess and modify postural abnormalities, address imbalance/dizziness and instruct in home and community integration to attain remaining goals. [x]  See Plan of Care  []  See progress note/recertification  []  See Discharge Summary         Progress towards goals / Updated goals: · Short Term Goals: To be accomplished in  2  weeks:  1. Patient will be compliant with HEP for sx management to address the above listed deficits. . Goal progressing, pt notes compliance  (12/23/19), cont 1/2/2020     · Long Term Goals: To be accomplished in  8-12  treatments:  1. Patient to be independent & compliant with HEP in preparation for D/C.  2. Patient to increase FOTO score to 67 indicating improved functional abilities and QOL. 3. Patient to increase strength in R hip to 5/5 to facilitate normalized gait. Goal progressing with initiation of TE today (12/23/19)  4. Patient able to ambulate with no antalgia with no AD for 1000 ft. Improved with min antalgic pattern, increased ZULEIMA on the R (12/23/19)  5. Patient able to negotiate 4 therapy steps with reciprocal pattern.   -Goal progressing; pt notes improved ability to negotiate short steps normally (12/31/19)    PLAN  [x]  Upgrade activities as tolerated     [x]  Continue plan of care  []  Update interventions per flow sheet       []  Discharge due to:_   [x]  Other: add SLR ABD and EXT NV to progress LTG 3 and [address] [place] [address] 1/6/2020    Future Appointments   Date Time Provider Shahriar Moran   1/6/2020  4:30 PM [facility]   1/8/2020  4:30 PM [facility]   1/13/2020  4:30 PM [facility]   1/15/2020  4:30 PM Ilir Willis, [facility]

## 2020-01-08 ENCOUNTER — HOSPITAL ENCOUNTER (OUTPATIENT)
Dept: PHYSICAL THERAPY | Age: 65
Discharge: HOME | End: 2020-01-08
Payer: COMMERCIAL

## 2020-01-08 PROCEDURE — 97140 MANUAL THERAPY 1/> REGIONS: CPT

## 2020-01-08 PROCEDURE — 97110 THERAPEUTIC EXERCISES: CPT

## 2020-01-08 NOTE — PROGRESS NOTES
PT DAILY TREATMENT NOTE     Patient Name: Yung Silva  Date:2020  : 1955  [x]  Patient  Verified  Payor: Kendra Gage / Plan: [employer] / Product Type: PPO /    In time: 4:30 pm            Out time: 5:31 pm  Total Treatment Time (min): 61  Visit #: 6 of     Treatment Area: Acute right hip pain [M25.551]  Other acute postprocedural pain [G89.18]    SUBJECTIVE  Pain Level (0-10 scale): 1-2  Any medication changes, allergies to medications, adverse drug reactions, diagnosis change, or new procedure performed?: [x] No    [] Yes (see summary sheet for update)  Subjective functional status/changes:   [] No changes reported  Patient notes inc'd walking recently and has not been icing. \"I am just achy. \" No difficulty with stairs, I go one foot per step.     OBJECTIVE  Modality rationale: decrease edema, decrease inflammation and decrease pain to improve the patients ability to ambulate, full work duties    Min Type Additional Details   10 [x]  Ice     []  heat  []  Ice massage Position: seated per pt preference  Location: (R) anterior hip    []  Vasopneumatic Device Pressure:       [] lo [] med [] hi   Temperature: [] lo [] med [] hi   [x] Skin assessment post-treatment:  [x]intact []redness- no adverse reaction       []redness - adverse reaction:       39 min Therapeutic Exercise:  [x] See flow sheet: added SLR x 3, retro walking and standing marching   Rationale: increase ROM and increase strength to improve the patients ability to ascend stairs, aDLs, mobility     12 min Manual Therapy: (R) hip PROM / manual hip ADD stretch, roller to hip ADD, PROM hip flexion and abduction, SL hip extension PROM   Rationale: decrease pain, increase ROM, increase tissue extensibility and decrease trigger points to improve mobility for ADLs, work, ambulation     0 min Gait Training: NT   Rationale: improve gait patterning for return to PLOF           X min Patient Education: [x] Review HEP     Other Objective/Functional Measures:        Pain Level (0-10 scale) post treatment: 0-1    ASSESSMENT/Changes in Function:   Patient responding well to treatment with improving gait quality in clinic; able to demo minimal antalgic gait without (A) and normalized with use of SPC. Fatigued easily with all long-lever hip strengthening today and patient would benefit from continued strengthening to improve (I) with amb. Patient will continue to benefit from skilled PT services to modify and progress therapeutic interventions, address functional mobility deficits, address ROM deficits, address strength deficits, analyze and address soft tissue restrictions, analyze and cue movement patterns, analyze and modify body mechanics/ergonomics, assess and modify postural abnormalities, address imbalance/dizziness and instruct in home and community integration to attain remaining goals. [x]  See Plan of Care  []  See progress note/recertification  []  See Discharge Summary         Progress towards goals / Updated goals: · Short Term Goals: To be accomplished in  2  weeks:  1. Patient will be compliant with HEP for sx management to address the above listed deficits. . Goal progressing, pt notes compliance  (12/23/19), cont 1/2/2020     · Long Term Goals: To be accomplished in  8-12  treatments:  1. Patient to be independent & compliant with HEP in preparation for D/C.  2. Patient to increase FOTO score to 67 indicating improved functional abilities and QOL. 3. Patient to increase strength in R hip to 5/5 to facilitate normalized gait. Goal progressing with initiation of TE today (12/23/19)  4. Patient able to ambulate with no antalgia with no AD for 1000 ft. -Goal progressing; normalized gait with use of SPC (1/8/20)  5. Patient able to negotiate 4 therapy steps with reciprocal pattern.   -Goal progressing; pt notes improved ability to negotiate short steps normally (12/31/19)    PLAN  [x]  Upgrade activities as tolerated     [x] Continue plan of care  []  Update interventions per flow sheet       []  Discharge due to:_   []  Other:_    Eliazar Hendricks, PTA 1/8/2020    Future Appointments   Date Time Provider Shahriar Moran   1/8/2020  4:30 PM [facility]   1/13/2020  4:30 PM [facility]   1/15/2020  4:30 PM Michael Alston PTA [facility]

## 2020-01-13 ENCOUNTER — HOSPITAL ENCOUNTER (OUTPATIENT)
Dept: PHYSICAL THERAPY | Age: 65
Discharge: HOME | End: 2020-01-13
Payer: COMMERCIAL

## 2020-01-13 PROCEDURE — 97140 MANUAL THERAPY 1/> REGIONS: CPT

## 2020-01-13 PROCEDURE — 97110 THERAPEUTIC EXERCISES: CPT

## 2020-01-13 NOTE — PROGRESS NOTES
PT DAILY TREATMENT NOTE     Patient Name: Rhett Singer  Date:2020  : 1955  [x]  Patient  Verified  Payor: Davis Medico / Plan: [employer] / Product Type: PPO /    In time: 4:30 pm           Out time: 5:25 pm  Total Treatment Time (min): 55  Visit #: 7 of     Treatment Area: Acute right hip pain [M25.551]  Other acute postprocedural pain [G89.18]    SUBJECTIVE  Pain Level (0-10 scale): 3-4 in (L) knee, 0-1 in (R) hip  Any medication changes, allergies to medications, adverse drug reactions, diagnosis change, or new procedure performed?: [x] No    [] Yes (see summary sheet for update)  Subjective functional status/changes:   [] No changes reported  \"I feel achy all over from the rheumatoid arthritis, even in my hands, but the hip is fine. \" Patient states she feels she is walking more normally without having to use her cane.     OBJECTIVE  Modality rationale: decrease edema, decrease inflammation and decrease pain to improve the patients ability to ambulate, full work duties    Min Type Additional Details   10 [x]  Ice     []  heat  []  Ice massage Position: seated per pt preference  Location: (R) anterior hip    []  Vasopneumatic Device Pressure:       [] lo [] med [] hi   Temperature: [] lo [] med [] hi   [x] Skin assessment post-treatment:  [x]intact []redness- no adverse reaction       []redness - adverse reaction:       32 min Therapeutic Exercise:  [x] See flow sheet:   Rationale: increase ROM and increase strength to improve the patients ability to ascend stairs, aDLs, mobility     13 min Manual Therapy: (R) hip PROM / manual hip ADD stretch, roller to hip ADD, PROM hip flexion and abduction, SL hip extension PROM; TPR to (R) external oblique   Rationale: decrease pain, increase ROM, increase tissue extensibility and decrease trigger points to improve mobility for ADLs, work, ambulation     0 min Gait Training: NT   Rationale: improve gait patterning for return to PLOF           X min Patient Education: [x] Review HEP     Other Objective/Functional Measures:   External oblique TPR reproduces right hip hip pain. Pain Level (0-10 scale) post treatment: 2    ASSESSMENT/Changes in Function:   Reduced tolerance to therex sec hs of RA. Patient demos improved gait quality without use of SPC. Mild gait impairment as pt demos decreased knee flexion to TKE during midstance (B). Patient will continue to benefit from skilled PT services to modify and progress therapeutic interventions, address functional mobility deficits, address ROM deficits, address strength deficits, analyze and address soft tissue restrictions, analyze and cue movement patterns, analyze and modify body mechanics/ergonomics, assess and modify postural abnormalities, address imbalance/dizziness and instruct in home and community integration to attain remaining goals. [x]  See Plan of Care  []  See progress note/recertification  []  See Discharge Summary         Progress towards goals / Updated goals: · Short Term Goals: To be accomplished in  2  weeks:  1. Patient will be compliant with HEP for sx management to address the above listed deficits. . Goal progressing, pt notes compliance  (12/23/19), cont 1/2/2020     · Long Term Goals: To be accomplished in  8-12  treatments:  1. Patient to be independent & compliant with HEP in preparation for D/C.  2. Patient to increase FOTO score to 67 indicating improved functional abilities and QOL. 3. Patient to increase strength in R hip to 5/5 to facilitate normalized gait. Goal progressing with initiation of TE today (12/23/19)  4. Patient able to ambulate with no antalgia with no AD for 1000 ft. -Goal progressing; normalized gait with use of SPC (1/8/20)  5. Patient able to negotiate 4 therapy steps with reciprocal pattern.   -Goal progressing; pt notes improved ability to negotiate short steps normally (12/31/19)    PLAN  [x]  Upgrade activities as tolerated     [x]  Continue plan of care  []  Update interventions per flow sheet       []  Discharge due to:_   []  Other:_    Radha Sanchez PTA 1/13/2020    Future Appointments   Date Time Provider Shahriar Moran   1/13/2020  4:30 PM Suzan Liang [facility]   1/15/2020  4:30 PM Jelani Timmons PTA [facility]

## 2020-01-15 ENCOUNTER — HOSPITAL ENCOUNTER (OUTPATIENT)
Dept: PHYSICAL THERAPY | Age: 65
Discharge: HOME | End: 2020-01-15
Payer: COMMERCIAL

## 2020-01-15 PROCEDURE — 97110 THERAPEUTIC EXERCISES: CPT

## 2020-01-15 PROCEDURE — 97140 MANUAL THERAPY 1/> REGIONS: CPT

## 2020-01-15 NOTE — PROGRESS NOTES
PT DAILY TREATMENT NOTE     Patient Name: Marciano Malagon  Date:1/15/2020  : 1955  [x]  Patient  Verified  Payor: Adan Oconnell / Plan: [place] [employer] / Product Type: PPO /    In time: 4:30 pm       Out time: 5:32 pm  Total Treatment Time (min): 62  Visit #: 8 of     Treatment Area: Acute right hip pain [M25.551]  Other acute postprocedural pain [G89.18]    SUBJECTIVE  Pain Level (0-10 scale): 3-4 in (L) knee, 0 in (R) hip  Any medication changes, allergies to medications, adverse drug reactions, diagnosis change, or new procedure performed?: [x] No    [] Yes (see summary sheet for update)  Subjective functional status/changes:   [] No changes reported  \"It's the left knee. \" Patient feels cortisone shot is wearing off.     OBJECTIVE  Modality rationale: decrease edema, decrease inflammation and decrease pain to improve the patients ability to ambulate, full work duties    Min Type Additional Details   10 [x]  Ice     []  heat  []  Ice massage Position: seated per pt preference  Location: (R) anterior hip    []  Vasopneumatic Device Pressure:       [] lo [] med [] hi   Temperature: [] lo [] med [] hi   [x] Skin assessment post-treatment:  [x]intact []redness- no adverse reaction       []redness - adverse reaction:       32 min Therapeutic Exercise:  [x] See flow sheet: reassessment   Rationale: increase ROM and increase strength to improve the patients ability to ascend stairs, aDLs, mobility     13 min Manual Therapy: (R) hip PROM / manual hip ADD stretch; PROM hip flexion and abduction, scar massage; k-taping for (L) knee MCL support   Rationale: decrease pain, increase ROM, increase tissue extensibility and decrease trigger points to improve mobility for ADLs, work, ambulation     0 min Gait Training: NT   Rationale: improve gait patterning for return to PLOF           X min Patient Education: [x] Review HEP     Other Objective/Functional Measures:   Subjective improvement of 70-80% in symptoms since IE reported. FOTO score: unchanged at 51/100 limited by running (not patient's PLOF and patient has been encouraged to avoid running for joint preservation)  Improvements: overall pain, perceived gait quality, amb without SPC for longer distances  Deficits: stair negotiation community-height steps, heavy household chores  Hip AROM: flex WFL, abd 51 deg, ER 35 deg, IR 22 deg, ext 10 deg  (R) hip strength: flex 4/5 with pain, abd 4/5, ext 4+/5  (for reference, left hip strength flex 5/5, abd 4/5, ext 4+/5)     Pain Level (0-10 scale) post treatment: 2    ASSESSMENT/Changes in Function:   See PN. Patient will continue to benefit from skilled PT services to modify and progress therapeutic interventions, address functional mobility deficits, address ROM deficits, address strength deficits, analyze and address soft tissue restrictions, analyze and cue movement patterns, analyze and modify body mechanics/ergonomics, assess and modify postural abnormalities, address imbalance/dizziness and instruct in home and community integration to attain remaining goals. []  See Plan of Care  [x]  See progress note/recertification (9/13/43)   []  See Discharge Summary         Progress towards goals / Updated goals:  1. Patient to be independent & compliant with HEP in preparation for D/C. -Goal met  2. Patient to increase FOTO score to 67 indicating improved functional abilities and QOL. -Goal not met; no change at 51/100, although expecting minimal changes due to deferral of running questions\"  3. Patient to increase strength in R hip to 5/5 to facilitate normalized gait. -Goal progressing; flex 4/5 with pain, abd 4/5, ext 4+/5  4. Patient able to ambulate with no antalgia with no AD for 1000 ft. -Goal met; pt notes decreasing use of SPC, no difficulty ambulating 1000 ft, but notes difficulty bearing weight evenly when not cognizant of form with gait  5. Patient able to negotiate 4 therapy steps with reciprocal pattern.   -Goal met; pt demos ability to negotiate 6 inch therapy stairs (4 steps) with reciprocal pattern, but notes difficulty negotiating higher steps    PLAN  [x]  Upgrade activities as tolerated     [x]  Continue plan of care  []  Update interventions per flow sheet       []  Discharge due to:_   [x]  Other: see PN; cont 1-2x4 to progress strength for stairs    Leela November, PTA 1/15/2020    No future appointments.

## 2020-01-24 ENCOUNTER — HOSPITAL ENCOUNTER (OUTPATIENT)
Dept: PHYSICAL THERAPY | Age: 65
Discharge: HOME | End: 2020-01-24
Payer: COMMERCIAL

## 2020-01-24 PROCEDURE — 97035 APP MDLTY 1+ULTRASOUND EA 15: CPT

## 2020-01-24 PROCEDURE — 97140 MANUAL THERAPY 1/> REGIONS: CPT

## 2020-01-24 PROCEDURE — 97110 THERAPEUTIC EXERCISES: CPT

## 2020-01-24 NOTE — PROGRESS NOTES
PT DAILY TREATMENT NOTE 8-14    Patient Name: Noy Guerrero  Date:2020  : 1955  [x]  Patient  Verified  Payor: Venus Mancini / Plan: [place] [employer] [place] / Product Type: PPO /    In time: 7:29 am       Out time: 8:24 am  Total Treatment Time (min): 55  Visit #: 1 of 8    Treatment Area: Acute right hip pain [M25.551]  Other acute postprocedural pain [G89.18]    SUBJECTIVE  Pain Level (0-10 scale): 2-3  Any medication changes, allergies to medications, adverse drug reactions, diagnosis change, or new procedure performed?: [x] No    [] Yes (see summary sheet for update)  Subjective functional status/changes:   [] No changes reported  \"I have a new prescription from the doctor. \"    OBJECTIVE  Modality rationale: decrease edema, decrease inflammation and decrease pain to improve the patients ability to ambulate, full work duties    Min Type Additional Details   PD []  Ice     []  heat  []  Ice massage Position:   Location:   8 [x]  Ultrasound, 50% pulsed Location: (R) anterior hip  Position: supine   [x] Skin assessment post-treatment:  [x]intact []redness- no adverse reaction       []redness - adverse reaction:       38 min Therapeutic Exercise:  [x] See flow sheet:    Rationale: increase ROM and increase strength to improve the patients ability to ascend stairs, aDLs, mobility     9 min Manual Therapy: (R) hip PROM / manual hip ADD stretch; PROM hip flexion and abduction   Rationale: decrease pain, increase ROM, increase tissue extensibility and decrease trigger points to improve mobility for ADLs, work, ambulation     0 min Gait Training: NT   Rationale: improve gait patterning for return to PLOF           X min Patient Education: [x] Review HEP     Other Objective/Functional Measures:        Pain Level (0-10 scale) post treatment: 0    ASSESSMENT/Changes in Function:   Initiated US to the right hip flexor for tendinitis per new  script with good tolerance.  Patient notes k-taping to the knee from previous session aided in pain relief, and patient not having knee pain today. Patient will continue to benefit from skilled PT services to modify and progress therapeutic interventions, address functional mobility deficits, address ROM deficits, address strength deficits, analyze and address soft tissue restrictions, analyze and cue movement patterns, analyze and modify body mechanics/ergonomics, assess and modify postural abnormalities, address imbalance/dizziness and instruct in home and community integration to attain remaining goals. []  See Plan of Care  [x]  See progress note/recertification (3/09/53)   []  See Discharge Summary         Progress towards goals / Updated goals:  1. Patient to increase FOTO score to 67 indicating improved functional abilities and QOL. 2. Patient to increase strength in R hip to 5/5 to facilitate normalized gait.     PLAN  [x]  Upgrade activities as tolerated     [x]  Continue plan of care  []  Update interventions per flow sheet       []  Discharge due to:_   [x]  Other: see PN; cont 1-2x4 to progress strength for stairs    Leela November, PTA 1/24/2020    Future Appointments   Date Time Provider Shahriar Moran   1/29/2020  6:00 PM Merlyn Watts [place] [address]   2/4/2020  4:30 PM Gris Painting., PT [place] [address]   2/6/2020  4:30 PM Reyna Duran, PT [place] [address]   2/11/2020  4:30 PM Reyna Duran, PT [place] [address]   2/13/2020  4:30 PM [facility] 1 [facility] [address] [place] [address]

## 2020-01-24 NOTE — PROGRESS NOTES
[address] [facility] [address] A[address]  Phone: [phone] Fax: [phone]  PROGRESS NOTE  Patient Name: Sharee White : 1955   Treatment/Medical Diagnosis: Acute right hip pain [M25.551]  Other acute postprocedural pain [G89.18]   Referral Source: Darrin Canales MD     Date of Initial Visit: 19 Attended Visits: 8 Missed Visits: 0     SUMMARY OF TREATMENT  Patient is a 59 y.o. female who presents to [facility] with Dx of [facility]. Treatment has consisted of the following: Therapeutic exercise, Therapeutic activities, Neuromuscular re-education, Physical agent/modality, Gait/balance training, Manual therapy, Patient education, Self Care training, Functional mobility training, Home safety training and Stair training. CURRENT STATUS  Patient has made good overall progress in PT, reporting ability to wean from utilizing her AD for the majority of the day. Patient notes most difficulty negotiating stairs with a normalized pattern and lifting. Assessment as follows:  Subjective improvement of 70-80% in symptoms since IE reported. FOTO score: unchanged at 51/100 limited by running (not patient's PLOF and patient has been encouraged to avoid running for joint preservation)  Improvements: overall pain, perceived gait quality, amb without SPC for longer distances  Deficits: stair negotiation community-height steps, heavy household chores  Hip AROM: flex WFL, abd 51 deg, ER 35 deg, IR 22 deg, ext 10 deg  (R) hip strength: flex 4/5 with pain, abd 4/5, ext 4+/5  (for reference, left hip strength flex 5/5, abd 4/5, ext 4+/5)     Goal/Measure of Progress   1. Patient to be independent & compliant with HEP in preparation for D/C. -Goal met  2. Patient to increase FOTO score to 67 indicating improved functional abilities and QOL.   -Goal not met; no change at 51/100, although expecting minimal changes due to deferral of running questions\"  3. Patient to increase strength in R hip to 5/5 to facilitate normalized gait. -Goal progressing; flex 4/5 with pain, abd 4/5, ext 4+/5  4. Patient able to ambulate with no antalgia with no AD for 1000 ft. -Goal met; pt notes decreasing use of SPC, no difficulty ambulating 1000 ft, but notes difficulty bearing weight evenly when not cognizant of form with gait  5. Patient able to negotiate 4 therapy steps with reciprocal pattern.  -Goal met; pt demos ability to negotiate 6 inch therapy stairs (4 steps) with reciprocal pattern, but notes difficulty negotiating higher steps    New Goals to be achieved in __4-8__  treatments:  1. Patient to increase FOTO score to 67 indicating improved functional abilities and QOL. 2.  Patient to increase strength in right hip flexion to 4+/5 to improve car transfers, rebeca negotiation, tub transfers. 3.  Patient will be able to demo right hip ABD strength to 4+/5 to facilitate normalized gait.     RECOMMENDATIONS  Recommend cont skilled PT at 2x4 for 4-8 treatments to address deficits and achieve stated goals. If you have any questions/comments please contact us directly at [phone]. Thank you for allowing us to assist in the care of your patient. LPTA Signature: Jeanmarie Orourke  Date: 1/24/2020   PT Signature: Jose Medeiros DPMICHELLE Time: 6:58 AM   NOTE TO PHYSICIAN:  PLEASE COMPLETE THE ORDERS BELOW AND FAX TO   [facility]: [phone].   If you are unable to process this request in 24 hours please contact our office: [phone].  ___ I have read the above report and request that my patient continue as recommended.   ___ I have read the above report and request that my patient continue therapy with the following changes/special instructions:_________________________________________________________   ___ I have read the above report and request that my patient be discharged from therapy.      Physician Signature:        Date:       Time:

## 2020-01-29 ENCOUNTER — HOSPITAL ENCOUNTER (OUTPATIENT)
Dept: PHYSICAL THERAPY | Age: 65
Discharge: HOME | End: 2020-01-29
Payer: COMMERCIAL

## 2020-01-29 PROCEDURE — 97110 THERAPEUTIC EXERCISES: CPT

## 2020-01-29 PROCEDURE — 97140 MANUAL THERAPY 1/> REGIONS: CPT

## 2020-01-29 PROCEDURE — 97035 APP MDLTY 1+ULTRASOUND EA 15: CPT

## 2020-01-29 NOTE — PROGRESS NOTES
PT DAILY TREATMENT NOTE 8-14    Patient Name: Radha Burnett  Date:2020  : 1955  [x]  Patient  Verified  Payor: Alecia Rivas / Plan: [place] [employer] PPO / Product Type: PPO /    In time: 6:00 pm        Out time: 6:56 pm  Total Treatment Time (min): 56  Visit #: 2 of 8    Treatment Area: Acute right hip pain [M25.551]  Other acute postprocedural pain [G89.18]    SUBJECTIVE  Pain Level (0-10 scale): 1-2  Any medication changes, allergies to medications, adverse drug reactions, diagnosis change, or new procedure performed?: [x] No    [] Yes (see summary sheet for update)  Subjective functional status/changes:   [] No changes reported  \"It just hurts to lift my leg up. \"    OBJECTIVE  Modality rationale: decrease edema, decrease inflammation and decrease pain to improve the patients ability to ambulate, full work duties    Min Type Additional Details   9 [x]  Ice     []  heat  []  Ice massage Position: seated  Location: (R) anterior hip   8 [x]  Ultrasound, 50% pulsed Location: (R) anterior hip  Position: supine   [x] Skin assessment post-treatment:  [x]intact []redness- no adverse reaction       []redness - adverse reaction:       30 min Therapeutic Exercise:  [x] See flow sheet: added seated hip flexion with YTB   Rationale: increase ROM and increase strength to improve the patients ability to ascend stairs, aDLs, mobility     8 min Manual Therapy: PROM hip flexion, abduction, ER and IR   Rationale: decrease pain, increase ROM, increase tissue extensibility and decrease trigger points to improve mobility for ADLs, work, ambulation     0 min Gait Training: NT   Rationale: improve gait patterning for return to PLOF           X min Patient Education: [x] Review HEP - add seated hip flexion using YTB, 3x weekly to avoid tendonitis     Other Objective/Functional Measures:        Pain Level (0-10 scale) post treatment: 0    ASSESSMENT/Changes in Function:   Patient with normalized, (I) gait in clinic.      Patient will continue to benefit from skilled PT services to modify and progress therapeutic interventions, address functional mobility deficits, address ROM deficits, address strength deficits, analyze and address soft tissue restrictions, analyze and cue movement patterns, analyze and modify body mechanics/ergonomics, assess and modify postural abnormalities, address imbalance/dizziness and instruct in home and community integration to attain remaining goals. []  See Plan of Care  [x]  See progress note/recertification (4/03/56)   []  See Discharge Summary         Progress towards goals / Updated goals:  1. Patient to increase FOTO score to 67 indicating improved functional abilities and QOL. 2. Patient to increase strength in R hip to 5/5 to facilitate normalized gait.     PLAN  [x]  Upgrade activities as tolerated     [x]  Continue plan of care  []  Update interventions per flow sheet       []  Discharge due to:_   [x]  Other: see PN; cont 1-2x4 to progress strength for stairs    Mj Perrin, VICTOR MANUEL 1/29/2020    Future Appointments   Date Time Provider Shahriar Moran   1/29/2020  6:00 PM Isai Palm [facility]   2/4/2020  4:30 PM Regulo Pinto, PT [facility]   2/6/2020  4:30 PM Nick Eldridge, PT [facility]   2/11/2020  4:30 PM Nick Eldridge, PT [facility]   2/13/2020  4:30 PM [facility]

## 2020-02-04 ENCOUNTER — HOSPITAL ENCOUNTER (OUTPATIENT)
Dept: PHYSICAL THERAPY | Age: 65
Discharge: HOME | End: 2020-02-04
Payer: COMMERCIAL

## 2020-02-04 PROCEDURE — 97110 THERAPEUTIC EXERCISES: CPT

## 2020-02-04 PROCEDURE — 97140 MANUAL THERAPY 1/> REGIONS: CPT

## 2020-02-04 NOTE — PROGRESS NOTES
PT DAILY TREATMENT NOTE 8-    Patient Name: Karol Molina  Date:2020  : 1955  [x]  Patient  Verified  Payor: Pancho Zelaya / Plan: [place] [employer] P[place] / Product Type: PPO /    In time:434        Out time: 524  Total Treatment Time (min): 50  Visit #: 3 of 8    Treatment Area: Acute right hip pain [M25.551]  Other acute postprocedural pain [G89.18]    SUBJECTIVE  Pain Level (0-10 scale): 0  Any medication changes, allergies to medications, adverse drug reactions, diagnosis change, or new procedure performed?: [x] No    [] Yes (see summary sheet for update)  Subjective functional status/changes:   [] No changes reported  Patient reports doing well     OBJECTIVE  Modality rationale: decrease edema, decrease inflammation and decrease pain to improve the patients ability to ambulate, full work duties    Min Type Additional Details   PD [x]  Ice     []  heat  []  Ice massage Position: seated  Location: (R) anterior hip   NI  [x]  Ultrasound, 50% pulsed Location: (R) anterior hip  Position: supine   [x] Skin assessment post-treatment:  [x]intact []redness- no adverse reaction       []redness - adverse reaction:       40 min Therapeutic Exercise:  [x] See flow sheet:    Rationale: increase ROM and increase strength to improve the patients ability to ascend stairs, aDLs, mobility     10 min Manual Therapy: PROM hip flexion, ER and SL hip extension, prone quad stretch and IR/ER PROM    Rationale: decrease pain, increase ROM, increase tissue extensibility and decrease trigger points to improve mobility for ADLs, work, ambulation     0 min Gait Training: NT   Rationale: improve gait patterning for return to PLOF           X min Patient Education: [x] Review HEP     Other Objective/Functional Measures:   Knee strength 5/5    Deficits : stiffness after prolonged sitting , step over step stair negotiation   Improvement: \"more movement and less pain\" - after steroids      Pain Level (0-10 scale) post treatment: 0    ASSESSMENT/Changes in Function:   [facility], but encouraged patient to try to do SL to replicate step over step stair negotiation. Added SL hip hike on R to further progress SL strengthening - patient was challenged. Held US sec to no pain today. PD ice sec to going to [facility] after PT to finish walking. Patient will continue to benefit from skilled PT services to modify and progress therapeutic interventions, address functional mobility deficits, address ROM deficits, address strength deficits, analyze and address soft tissue restrictions, analyze and cue movement patterns, analyze and modify body mechanics/ergonomics, assess and modify postural abnormalities, address imbalance/dizziness and instruct in home and community integration to attain remaining goals. []  See Plan of Care  [x]  See progress note/recertification (0/17/39)   []  See Discharge Summary         Progress towards goals / Updated goals: All goals reviewed and progressing appropriately as of 2/4/2020  1. Patient to increase FOTO score to 67 indicating improved functional abilities and QOL. 2.  Patient to increase strength in right hip flexion to 4+/5 to improve car transfers, rebeca negotiation, tub transfers.   3.  Patient will be able to demo right hip ABD strength to 4+/5 to facilitate normalized gait.     PLAN  [x]  Upgrade activities as tolerated     [x]  Continue plan of care  []  Update interventions per flow sheet       []  Discharge due to:_   [x]  Other: PN due 2/17     Kiya Gaspar, PT 2/4/2020    Future Appointments   Date Time Provider Shahriar Moran   2/4/2020  4:30 PM Jesus Manuel Her, PT [facility]   2/6/2020  4:30 PM Arley Reyes, PT [facility]   2/11/2020  4:30 PM Arley Reyes, PT [facility]   2/13/2020  4:30 [facility]

## 2020-02-06 ENCOUNTER — HOSPITAL ENCOUNTER (OUTPATIENT)
Dept: PHYSICAL THERAPY | Age: 65
Discharge: HOME | End: 2020-02-06
Payer: COMMERCIAL

## 2020-02-06 PROCEDURE — 97140 MANUAL THERAPY 1/> REGIONS: CPT

## 2020-02-06 PROCEDURE — 97110 THERAPEUTIC EXERCISES: CPT

## 2020-02-06 NOTE — PROGRESS NOTES
PT DAILY TREATMENT NOTE     Patient Name: Nate Griffith  Date:2020  : 1955  [x]  Patient  Verified  Payor: Concetta Screws / Plan: [place] [employer] [place] / Product Type: PPO /    In time:4:33  Out time:5:17  Total Treatment Time (min): 44  Total Timed Codes (min): 44  1:1 Treatment Time (min): 44   Visit #: 4 of 8    Treatment Area: Acute right hip pain [M25.551]  Other acute postprocedural pain [G89.18]    SUBJECTIVE  Pain Level (0-10 scale): 0  Any medication changes, allergies to medications, adverse drug reactions, diagnosis change, or new procedure performed?: [x] No    [] Yes (see summary sheet for update)  Subjective functional status/changes:   [] No changes reported  No limitations. Can I D/C because I might need to save sessions. I got steroids and that really helped. Deficits: hip ER in sitting (cross legged); min achy with standing (5 steps to loosen up); heavy lifting   Improvements: ascending stairs (min discomfort); no pain with ambulation; lifting at the [facility]     OBJECTIVE      34 (29) min Therapeutic Exercise:  [x] See flow sheet : performed and assessed FOTO with PT to get accurate score; D/C planning, reassessment    Rationale: increase ROM, increase strength and improve coordination to improve the patients ability to ambulate, ADLs, mobility    10 min Manual Therapy:   PROM hip flexion, ER and SL hip extension, prone quad stretch and IR/ER PROM    Rationale: decrease pain, increase ROM, increase tissue extensibility and decrease trigger points to improve gait, stairs           x min Patient Education: [x] Review HEP    [x] Progressed/Changed HEP based on: final D/C planning     [] positioning   [] body mechanics   [] transfers   [] heat/ice application        Other Objective/Functional Measures:   Subjective improvement of 80-90% in symptoms since IE reported.   FOTO score: 76/100 (was 51/100 at IE)    Hip AROM: flex WFL, abd 51 deg, ER 45 deg (prone); 40 (sitting), IR 35 deg (prone); 30 (sitting), ext 20 deg  (R) hip strength: flex 4/5 with Min c/o pain, abd 4+/5, ext 4+/5  (for reference, left hip strength flex 5/5, abd 4/5, ext 4+/5)  [place] min +  HS 90/90: 30 deg  Gait: min Comp Trendelenburg     Pain Level (0-10 scale) post treatment: 0    ASSESSMENT/Changes in Function: see D/C note     Patient will continue to benefit from skilled PT services to modify and progress therapeutic interventions, address functional mobility deficits, address ROM deficits, address strength deficits, analyze and address soft tissue restrictions, analyze and cue movement patterns and analyze and modify body mechanics/ergonomics to attain remaining goals. []  See Plan of Care  []  See progress note/recertification  []  See Discharge Summary         Progress towards goals / Updated goals:  1. Patient to increase FOTO score to 67 indicating improved functional abilities and QOL. Goal exceeded at 76/100 (2/6/2020)  2. Patient to increase strength in right hip flexion to 4+/5 to improve car transfers, rebeca negotiation, tub transfers. Goal progressing as able at 4/5 with resolving sxs of hip flexor tendonitis (2/6/2020  3.   Patient will be able to demo right hip ABD strength to 4+/5 to facilitate normalized gait.  Goal met at 4+/5 (2/6/2020)    PLAN  []  Upgrade activities as tolerated     []  Continue plan of care  []  Update interventions per flow sheet       [x]  Discharge due to:met goals   []  Fabiana Dewey, PT 2/6/2020  1:38 PM    Future Appointments   Date Time Provider Shahriar Moran   2/11/2020  4:30 PM Tran Veronica PT [facility]   2/13/2020  4:30 PM [facility]

## 2020-02-06 NOTE — PROGRESS NOTES
[address] [facility] [address] [facility] AT [address], [place], [place] [address]  Phone: [phone] Fax [phone] SUMMARY  Patient Name: Greg Ulrich : 1955   Treatment/Medical Diagnosis: Acute right hip pain [M25.551]  Other acute postprocedural pain [G89.18]   Referral Source: Manolo Flores MD     Date of Initial Visit: 19 Attended Visits: 12 Missed Visits: 0     SUMMARY OF TREATMENT  Patient is [phone] y. o. female who presents to [facility] at [facility] with Dx of [facility]. Treatment has consisted of the following: Therapeutic exercise, Therapeutic activities, Neuromuscular re-education, Physical agent/modality, Gait/balance training, Manual therapy, Patient education, Self Care training, Functional mobility training, Home safety training and Stair training    CURRENT STATUS  Pt made great progress in therapy. Pain ranges from 0-1/10, and pt rates 80-90% improvement. Score on the FOTO has improved from 51/100 at IE to 76/100 at D/C noting functional improvements. Deficits: hip ER in sitting (cross legged); min achy with standing (5 steps to loosen up); heavy lifting   Improvements: ascending stairs (min discomfort); no pain with ambulation; lifting at the [facility]     Objective Measures:     Hip AROM: flex WFL, abd 51 deg, ER 45 deg (prone); 40 (sitting), IR 35 deg (prone); 30 (sitting), ext 20 deg  (R) hip strength: flex 4/5 with Min c/o pain, abd 4+/5, ext 4+/5  (for reference, left hip strength flex 5/5, abd 4/5, ext 4+/5)  Ely min +  HS 90/90: 30 deg  Gait: min Comp Trendelenburg     Goals for this period include:  1.  Patient to increase FOTO score to 67 indicating improved functional abilities and QOL. Goal exceeded at 76/100 (2020)  2.  Patient to increase strength in right hip flexion to 4+/5 to improve car transfers, rebeca negotiation, tub transfers.  Goal progressing as able at 4/5 with resolving sxs of hip flexor tendonitis (2/6/2020  3.  Patient will be able to demo right hip ABD strength to 4+/5 to facilitate normalized gait.  Goal met at 4+/5 (2/6/2020)    RECOMMENDATIONS  Discontinue therapy. Progressing towards or have reached established goals. If you have any questions/comments please contact us directly at [phone]. Thank you for allowing us to assist in the care of your patient.   Therapist Signature: Pranav Ruelas DPT Date: 2/6/2020     Time: 5:37 PM

## 2020-02-11 ENCOUNTER — APPOINTMENT (OUTPATIENT)
Dept: PHYSICAL THERAPY | Age: 65
End: 2020-02-11
Payer: COMMERCIAL

## 2020-02-13 ENCOUNTER — APPOINTMENT (OUTPATIENT)
Dept: PHYSICAL THERAPY | Age: 65
End: 2020-02-13
Payer: COMMERCIAL

## 2021-04-29 ENCOUNTER — HOSPITAL ENCOUNTER (OUTPATIENT)
Dept: PHYSICAL THERAPY | Age: 66
Discharge: HOME | End: 2021-04-29
Payer: MEDICARE

## 2021-04-29 PROCEDURE — 97162 PT EVAL MOD COMPLEX 30 MIN: CPT

## 2021-04-29 PROCEDURE — 97140 MANUAL THERAPY 1/> REGIONS: CPT

## 2021-04-29 NOTE — PROGRESS NOTES
PT DAILY TREATMENT NOTE     Patient Name: Jazzmine Gallegos  Date:2021  : 1955  [x]  Patient  Verified  [place]or: [place] MEDICARE / Plan: [place] MEDICARE PART A & B / Product Type: Medicare /    In time:3:30  Out time:4:20  Total Treatment Time (min): 50  Total Timed Codes (min): 15  1:1 Treatment Time (min): 50   Visit #: 1 of 8-10    Treatment Area: Right hip pain [M25.551]    SUBJECTIVE  Pain Level (0-10 scale): 4  Any medication changes, allergies to medications, adverse drug reactions, diagnosis change, or new procedure performed?: [x] No    [] Yes (see summary sheet for update)  Subjective functional status/changes:   [] No changes reported  SEE IE for Subjective Information    OBJECTIVE      7 min Therapeutic Exercise:  [] See flow sheet :   Rationale: increase ROM and increase strength to improve the patients ability to walk , stand, stairs     8 min Manual Therapy:  RYLIE anterior innom correction (legs vertical, squeezing a ball in adduction and activing legs into hip extension). Hold for 5 breaths. Repeat 2 times    Rationale: decrease pain, increase ROM and increase tissue extensibility to improve ease of hip flexion movements   The manual therapy interventions were performed at a separate and distinct time from the therapeutic activities interventions  (na  Add 59 Modifier in conjunction with TA treatment)       x min Patient Education: [x] Review HEP    [] Progressed/Changed HEP based on:    Access Code: [email or web address]: [email or web address]: repared by: Gera Oquendo   anterior pelvis correction - 3 x daily - 7 x weekly - 2 sets - 5 reps   Prone Hip Extension - 1 x daily - 7 x weekly - 3 sets - 10 reps   Prone Hip Extension with Bent Knee - 1 x daily - 7 x weekly - 10 reps - 3 sets   Prone Quadriceps Stretch with Strap - 1 x daily - 7 x weekly - 1 sets - 3 reps - 30\" hold     [] positioning   [] body mechanics   [] transfers   [] heat/ice application Other Objective/Functional Measures:   Physical Therapy Evaluation- Hip  Gait:  See IE          ROM/Strength        AROM                     PROM        Strength (1-5)  Hip Left Right Left Right Left Right   Flexion 119 111p! 4+ 3+p! Extension 20 20   4+ 4-   Abduction     4+ 4   Adduction         IR (seated)/ prone 21/ 30 15/ 30   4 3+   ER (seated) / prone  45 / 42 21p! / 45    4 3+   Knee Left Right Left Right Left Right   Extension     5 4   Flexion     4+ 4     Flexibility:   Hamstrings: 90/90 HS test L 30 deg, R 20   Quadriceps: Rhonda Test + (B) minimally   Gastroc:  R 0 deg                                   Palpation see IE     Special Tests: (-) thigh thrust, + FADIR, + MAKENNA for groin/hip flexor pain     Pelvic Alignment  L leg long in supine,  Candido's test: R 11 deg, L 14 deg     Lumbar AROM: see IE       Pain Level (0-10 scale) post treatment: 2    ASSESSMENT/Changes in Function: see IE     Patient will continue to benefit from skilled PT services to modify and progress therapeutic interventions, address functional mobility deficits, address ROM deficits, address strength deficits, analyze and address soft tissue restrictions, analyze and cue movement patterns, analyze and modify body mechanics/ergonomics, assess and modify postural abnormalities, address imbalance/dizziness and instruct in home and community integration to attain remaining goals.      [x]  See Plan of Care  []  See progress note/recertification  []  See Discharge Summary         Progress towards goals / Updated goals:  SEE IE FOR GOALS     PLAN  []  Upgrade activities as tolerated     []  Continue plan of care  []  Update interventions per flow sheet       []  Discharge due to:_  [x]  Other:Initiate POC as stated in the IE      Justification for Eval Code Complexity:  Patient History : chronic pain; long-lasting tendinopathies  Examination see IE   Clinical Presentation: evolving with changing; not a typical presentation   Clinical Decision Making : FOTO 62/100     Kiera Boyer DPT 4/29/2021  12:15 PM    Future Appointments   Date Time Provider Shahriar Moran   4/29/2021  3:30 PM [facility]

## 2021-04-29 NOTE — PROGRESS NOTES
[address] [facility] PHYSICAL THERAPY AT [facility]   [id number] [place] [address] [address] [place] [address]  Phone: [phone] Fax: [phone] / [address] [address] PHYSICAL THERAPY SERVICES  Patient Name: Manolo Spencer : 1955   Medical   Diagnosis: Right hip pain [M25.551] Treatment Diagnosis: R hip flexor tendonitis   Onset Date: 2019     Referral Source: Ventura Mckay, * Start of Care [facility]): 2021   Prior Hospitalization: See medical history Provider #: [id number]   Prior Level of Function: Walking several miles; yardwork without energy conservation    Comorbidities: undergoing blood work for iron deficits; Medications: Verified on Patient Summary List   The Plan of Care and following information is based on the information from the initial evaluation.   ========================================================================  Assessment / key information:  Pt is a 78 yo female s/p Anterior approach THR in 2019 with unyielding R hip flexor tendonitis. CC is weakness limiting AROM hip flexion, ISRA for don shoes, walking long distances noting (B) glute med weakness and overall lethargy from walking taking so much effort. Pt wondering if she has iliopsoas impingement based on personal research relating to sxs. Previous rx has included the following: PT x 2; steroid packs; injection; R greater trochanteric bursitis injection. Past medical hx of stubborn tendonitis in the R achilles tendon, L extensor wad, R hip flexor. She presents with pain ranging from 3-7/10, located anterior groin, (B) glute med. Pain is made worse with hip flexion, ISRA, better with rest, avoidance. Denies red flags, Denies Bowel and bladder sxs. Pt has no c/o  radicular sxs in the R LE. Pelvic alignment: L LE long in supine; (-) long sit test; soft tissue makes palpation of pelvic landmarks difficult.  Standing - pelvis equal; prone - pelvis equal.   Possible anterior pelvic alignment (B) as noted with RYLIE testing (SL hip extension and adduction test) - addressed with HEP. L/S AROM: flexion to ankles, extension 25%, LSB 50%, RSB 50% with R glute med pain, LRotation WFL,  RRotation WFL. ROM/Strength        AROM                     PROM        Strength (1-5)  Hip Left Right Left Right Left Right   Flexion 119 111p! 4+ 3+p! Extension 20 20   4+ 4-   Abduction     4+ 4   Adduction         IR (seated)/ prone 21/ 30 15/ 30   4 3+   ER (seated) / prone  45 / 42 21p! / 45    4 3+   Knee Left Right Left Right Left Right   Extension     5 4   Flexion     4+ 4     Visual Inspection reveals WNL. Palpation reveals TTP and + jump sign iliopsoas, hip flexor group, glute med (B), greater trochanter R, piriformis R; Sacrum. Posture fair. Gait antalgic and wide ZULEIMA. Sensation is intact to light touch. Core stability is 75% no pain. SPECIAL TESTS:   (-) Special tests include: thigh thrust, SIJ compression,, distraction. (+) Special tests include: FADIR and MAKENNA for groin pain (superficial; muscular). FLEXIBILITY: HS 90/90 (R) 20, (L) 30.  Ely + minimally. Gastroc: R ankle 0 deg      Balance: fear of falling, not recently. Hx of falls caused by \"unsure\". SLS L 6\" mod sway ,R 8\" with max sway   Sh Rhomb: L 30\" with increased hip IR/ER strategy on R;  R 30\" wit increased ankle strategy and hip IR/ER. Rhomb EC: 30\" normal sway     Functional limitations include: doing yardwork without having to take rest breaks; walking > 20 minutes without (B) glute med pain, getting in car, don and doff R shoe. Must use (B) railing for stair negotiation. Not limited with bed mobility (stiffness)  in the morning or middle of the night.     Pt will benefit from PT interventions to address the aforementioned deficits and allow pt to return to PLOF.   ========================================================================  Eval Complexity: History: MEDIUM  Complexity : 1-2 comorbidities / personal factors will impact the outcome/ POC Exam:HIGH Complexity : 4+ Standardized tests and measures addressing body structure, function, activity limitation and / or participation in recreation  Presentation: MEDIUM Complexity : Evolving with changing characteristics  Clinical Decision Making:MEDIUM Complexity : FOTO score of 26-74Overall Complexity:MEDIUM  Problem List: pain affecting function, decrease ROM, decrease strength, impaired gait/ balance, decrease ADL/ functional abilitiies, decrease activity tolerance, decrease flexibility/ joint mobility and decrease transfer abilities   Treatment Plan may include any combination of the following: Therapeutic exercise, Therapeutic activities, Neuromuscular re-education, Physical agent/modality, Gait/balance training, Manual therapy, Aquatic therapy, Patient education, Self Care training, Functional mobility training, Home safety training and Stair training  Patient / Family readiness to learn indicated by: asking questions and interest  Persons(s) to be included in education: patient (P)  Barriers to Learning/Limitations: None  Measures taken:    Patient Goal (s): \"increase strength in legs, decrease groin pain \"   Patient self reported health status: good  Rehabilitation Potential: good   Short Term Goals: To be accomplished in  2  treatments:  1. Pt will be independent and compliant with HEP to decrease pain, increase ROM and return pt to PLOF. Status at last assessment: initiated    Long Term Goals: To be accomplished in  8-10  treatments:  1. Pt will increase score on the FOTO to > or = 69/100 to demo an increase in functional activity tolerance. Status at last assessment: 62/100   2. Pt will be able to self-manage ave pain to < or = 2/10 to improve ease of posture, mobility and self-care  Status at last assessment: ranges 3-7/10  3.  Pt will have an increase in R hip flexion strength to > or = 4+/5 without pain to restore car, bed t/f and dressing   Status at last assessment: 3+ with p!   4. Pt will have an improvement in generalized LE strength and endurance to allow her to walk for > or = 45 min without an increase in pain   Status at last assessment: 20 min with p! Frequency / Duration:   Patient to be seen  2  times per week for 8-10  treatments:  Patient / Caregiver education and instruction: self care, activity modification and exercises  Therapist Signature: Tran Joseph DPT Date: 1/98/3282   Certification Period: 4/29/21 to 7/27/21 Time: 12:15 PM   ========================================================================  I certify that the above Physical Therapy Services are being furnished while the patient is under my care. I agree with the treatment plan and certify that this therapy is necessary. Physician Signature:        Date:       Time:   Ced Ventura, *  Please sign and return to [facility] or you may fax the signed copy to [phone]. Thank you.

## 2021-05-03 ENCOUNTER — HOSPITAL ENCOUNTER (OUTPATIENT)
Dept: PHYSICAL THERAPY | Age: 66
Discharge: HOME | End: 2021-05-03
Payer: MEDICARE

## 2021-05-03 PROCEDURE — 97112 NEUROMUSCULAR REEDUCATION: CPT

## 2021-05-03 PROCEDURE — 97140 MANUAL THERAPY 1/> REGIONS: CPT

## 2021-05-03 PROCEDURE — 97110 THERAPEUTIC EXERCISES: CPT

## 2021-05-03 NOTE — PROGRESS NOTES
PT DAILY TREATMENT NOTE     Patient Name: Chucky Palma  Date:5/3/2021  : 1955  [x]  Patient  Verified  Payor: VA MEDICARE / Plan: VA MEDICARE PART A & B / Product Type: Medicare /    In time: 2:45 pm                  Out time: 3:48 pm  Total Treatment Time (min): 63  Total Timed Codes (min): 53  1:1 Treatment Time (min): 53  Visit #: 2 of 8-10    Treatment Area: Right hip pain [M25.551]    SUBJECTIVE  Pain Level (0-10 scale): 4  Any medication changes, allergies to medications, adverse drug reactions, diagnosis change, or new procedure performed?: [x] No    [] Yes (see summary sheet for update)  Subjective functional status/changes:   [] No changes reported  Patient notes soreness after initial evaluation and difficulty performing her wall HEP due to difficulty getting down to the floor.     OBJECTIVE  Modality rationale: decrease inflammation and decrease pain to improve the patients ability to perform ADLs     Min Type Additional Details    [] Estim: []Att   []Unatt                  []IFC  []Premod                                            []NMES    []Other:  []w/ice   []w/heat  Position:  Location:    []  Traction: [] Cervical       [] Lumbar                       [] Supine        [] Prone                       []Intermittent   []Continuous Lbs:  [] before manual  [] after manual    []  Ultrasound: []Continuous   [] Pulsed                           []1MHz   []3MHz Location:  W/cm2:    []  Iontophoresis with dexamethasone         Location: [] Take home patch   [] In clinic   10 [x]  Ice     []  heat  []  Ice massage Position: supine with LEs elevated on wedge  Location: (R) hip    []  Vasopneumatic Device Pressure: [] lo [] med [] hi   Temp: [] lo [] med [] hi   [x] Skin assessment post-treatment:  [x]intact    []redness- no adverse reaction                                                                                 []redness  adverse reaction:     29 min Therapeutic Exercise:  [x] See flow sheet: initiated per IE   Rationale: increase ROM, increase strength and improve coordination to improve the patients ability to perform ADLs      9 min Neuromuscular Re-education:  [x]  See flow sheet: initiated static balance per IE   Rationale: increase strength, improve coordination, improve balance and increase proprioception  to improve the patients ability to stand for longer periods of time    15 min Manual Therapy:  SI check - (R) upslip; TPR to (R) glut max, glut med, psoas major f/b grade III hip mobs inferiorly using belt   Rationale: decrease pain, increase ROM, increase tissue extensibility and decrease trigger points to improve the patients ability to improve walking tolerance. The manual therapy interventions were performed at a separate and distinct time from the therapeutic activities interventions. with TE min Patient Education: [x] Review HEP; dicussed diaphragmatic breathing and pt able to demo in clinic properly in supine     Other Objective/Functional Measures:   SI check, supine to longsit: seemingly right upslip = (+) TTP at the glut max/med and psoas on the (R)    Pain Level (0-10 scale) post treatment: 2    ASSESSMENT/Changes in Function:   Good tolerance to treatment today with patient req 100% verbal/tactile cueing and demo for proper form/technique with all newly introduced therex. Noted increased difficulty with (R)-sided donkey kicks versus (L). Patient will continue to benefit from skilled PT services to modify and progress therapeutic interventions, address functional mobility deficits, address ROM deficits, address strength deficits, analyze and address soft tissue restrictions, analyze and cue movement patterns, analyze and modify body mechanics/ergonomics, assess and modify postural abnormalities and address imbalance/dizziness to attain remaining goals.      [x]  See Plan of Care  []  See progress note/recertification  []  See Discharge Summary         Progress towards goals / Updated goals: · Short Term Goals: To be accomplished in  2  treatments:  1. Pt will be independent and compliant with HEP to decrease pain, increase ROM and return pt to PLOF. Status at last assessment: initiated   Current: goal progressing; modified today to improve comfort and subsequently compliance (5/3/21)  · Long Term Goals: To be accomplished in  8-10  treatments:  1. Pt will increase score on the FOTO to > or = 69/100 to demo an increase in functional activity tolerance. Status at last assessment: 62/100   2. Pt will be able to self-manage ave pain to < or = 2/10 to improve ease of posture, mobility and self-care  Status at last assessment: ranges 3-7/10  3. Pt will have an increase in R hip flexion strength to > or = 4+/5 without pain to restore car, bed t/f and dressing   Status at last assessment: 3+ with p!   4. Pt will have an improvement in generalized LE strength and endurance to allow her to walk for > or = 45 min without an increase in pain   Status at last assessment: 20 min with p!      PLAN  [x]  Upgrade activities as tolerated     [x]  Continue plan of care  []  Update interventions per flow sheet       []  Discharge due to:_  [x]  Other: assess response to treatment     Jae Ybarra, PTA 5/3/2021

## 2021-05-10 ENCOUNTER — APPOINTMENT (OUTPATIENT)
Dept: PHYSICAL THERAPY | Age: 66
End: 2021-05-10
Payer: MEDICARE

## 2021-05-12 ENCOUNTER — HOSPITAL ENCOUNTER (OUTPATIENT)
Dept: PHYSICAL THERAPY | Age: 66
Discharge: HOME | End: 2021-05-12
Payer: MEDICARE

## 2021-05-12 PROCEDURE — 97110 THERAPEUTIC EXERCISES: CPT

## 2021-05-12 PROCEDURE — 97140 MANUAL THERAPY 1/> REGIONS: CPT

## 2021-05-12 NOTE — PROGRESS NOTES
PT DAILY TREATMENT NOTE     Patient Name: Junior Fraser  Date:2021  : 1955  [x]  Patient  Verified  Payor: VA MEDICARE / Plan: VA MEDICARE PART A & B / Product Type: Medicare /    In time: 1:15       Out time: 2:00  Total Treatment Time (min): 45  Total Timed Codes (min): 35  1:1 Treatment Time (min): 35  Visit #: 3 of 8-10    Treatment Area: Right hip pain [M25.551]    SUBJECTIVE  Pain Level (0-10 scale): 1-2/10  Any medication changes, allergies to medications, adverse drug reactions, diagnosis change, or new procedure performed?: [x] No    [] Yes (see summary sheet for update)  Subjective functional status/changes:   [] No changes reported  If I sleep perfectly flat I am ok but if I lay in any other position it bothers me. Some days are good and some bad. I have been doing the exercises at home but cannot do the hip correction because she has no wall space. Patient reports hip flexor pain with hip ER.      OBJECTIVE  Modality rationale: decrease inflammation and decrease pain to improve the patients ability to perform ADLs     Min Type Additional Details    [] Estim: []Att   []Unatt                  []IFC  []Premod                                            []NMES    []Other:  []w/ice   []w/heat  Position:  Location:    []  Traction: [] Cervical       [] Lumbar                       [] Supine        [] Prone                       []Intermittent   []Continuous Lbs:  [] before manual  [] after manual    []  Ultrasound: []Continuous   [] Pulsed                           []1MHz   []3MHz Location:  W/cm2:    []  Iontophoresis with dexamethasone         Location: [] Take home patch   [] In clinic   10 [x]  Ice     []  heat  []  Ice massage Position: supine with LEs elevated on wedge  Location: (R) hip    []  Vasopneumatic Device Pressure: [] lo [] med [] hi   Temp: [] lo [] med [] hi   [x] Skin assessment post-treatment:  [x]intact    []redness- no adverse reaction []redness  adverse reaction:     15 min Therapeutic Exercise:  [x] See flow sheet:    Rationale: increase ROM, increase strength and improve coordination to improve the patients ability to perform ADLs      12 min Neuromuscular Re-education:  [x]  See flow sheet:    Rationale: increase strength, improve coordination, improve balance and increase proprioception  to improve the patients ability to stand for longer periods of time    8 min Manual Therapy:  SI check - right PI/left AI corrected with MET /shotgun, right LE long axis traction   Rationale: decrease pain, increase ROM, increase tissue extensibility and decrease trigger points to improve the patients ability to improve walking tolerance. The manual therapy interventions were performed at a separate and distinct time from the therapeutic activities interventions. with TE min Patient Education: [x] Review HEP     Other Objective/Functional Measures:   SI check, supine to longsit: right PI/left AI  - educated on self correction of pelvic rotation at home    Pain Level (0-10 scale) post treatment: 0/10 \"post hip pain\" from positioning    ASSESSMENT/Changes in Function:   Patient tolerated session well with no increase in pain. Educated on self correction MET at home for pelvic rotation. Improved equal weightbearing reported after hip correction, patient has reported feeling herself pulling towards the right while walking on the TM during warmup. Patient with posterior hip pain at end of session due to positioning in supine with wedge during CP.      Patient will continue to benefit from skilled PT services to modify and progress therapeutic interventions, address functional mobility deficits, address ROM deficits, address strength deficits, analyze and address soft tissue restrictions, analyze and cue movement patterns, analyze and modify body mechanics/ergonomics, assess and modify postural abnormalities and address imbalance/dizziness to attain remaining goals. [x]  See Plan of Care  []  See progress note/recertification  []  See Discharge Summary         Progress towards goals / Updated goals: · Short Term Goals: To be accomplished in  2  treatments:  1. Pt will be independent and compliant with HEP to decrease pain, increase ROM and return pt to PLOF. Status at last assessment: initiated   Current: goal progressing; modified today to improve comfort and subsequently compliance (5/3/21)  · Long Term Goals: To be accomplished in  8-10  treatments:  1. Pt will increase score on the FOTO to > or = 69/100 to demo an increase in functional activity tolerance. Status at last assessment: 62/100   2. Pt will be able to self-manage ave pain to < or = 2/10 to improve ease of posture, mobility and self-care  Status at last assessment: ranges 3-7/10  3. Pt will have an increase in R hip flexion strength to > or = 4+/5 without pain to restore car, bed t/f and dressing   Status at last assessment: 3+ with p!   4. Pt will have an improvement in generalized LE strength and endurance to allow her to walk for > or = 45 min without an increase in pain   Status at last assessment: 20 min with p!      PLAN  [x]  Upgrade activities as tolerated     [x]  Continue plan of care  []  Update interventions per flow sheet       []  Discharge due to:_  []  Other:    MILANA Gramajo 5/12/2021 2:00 PM

## 2021-05-19 ENCOUNTER — HOSPITAL ENCOUNTER (OUTPATIENT)
Dept: PHYSICAL THERAPY | Age: 66
Discharge: HOME | End: 2021-05-19
Payer: MEDICARE

## 2021-05-19 PROCEDURE — 97140 MANUAL THERAPY 1/> REGIONS: CPT

## 2021-05-19 PROCEDURE — 97112 NEUROMUSCULAR REEDUCATION: CPT

## 2021-05-19 PROCEDURE — 97110 THERAPEUTIC EXERCISES: CPT

## 2021-05-19 NOTE — PROGRESS NOTES
PT DAILY TREATMENT NOTE     Patient Name: Lillian Garcia  Date:2021  : 1955  [x]  Patient  Verified  Payor: Josie Hernandez / Plan: [place] / Product Type: Medicare /    In time: 3:33 pm      Out time: 4:20 pm  Total Treatment Time (min): 50  Total Timed Codes (min): 50  1:1 Treatment Time (min): 40  Visit #: 4 of 8-10    Treatment Area: Right hip pain [M25.551]    SUBJECTIVE  Pain Level (0-10 scale): 2  Any medication changes, allergies to medications, adverse drug reactions, diagnosis change, or new procedure performed?: [x] No    [] Yes (see summary sheet for update)  Subjective functional status/changes:   [] No changes reported  Patient notes continued discomfort.     OBJECTIVE  Modality rationale: decrease inflammation and decrease pain to improve the patients ability to perform ADLs     Min Type Additional Details    [] Estim: []Att   []Unatt                  []IFC  []Premod                                            []NMES    []Other:  []w/ice   []w/heat  Position:  Location:    []  Traction: [] Cervical       [] Lumbar                       [] Supine        [] Prone                       []Intermittent   []Continuous Lbs:  [] before manual  [] after manual    []  Ultrasound: []Continuous   [] Pulsed                           []1MHz   []3MHz Location:  W/cm2:    []  Iontophoresis with dexamethasone         Location: [] Take home patch   [] In clinic   PD [x]  Ice     []  heat  []  Ice massage Position: supine with LEs elevated on wedge  Location: (R) hip    []  Vasopneumatic Device Pressure: [] lo [] med [] hi   Temp: [] lo [] med [] hi   [x] Skin assessment post-treatment:  [x]intact    []redness- no adverse reaction                                                                                 []redness - adverse reaction:     25 min Therapeutic Exercise:  [x] See flow sheet:   Rationale: increase ROM, increase strength and improve coordination to improve the patients ability to perform ADLs      17 min Neuromuscular Re-education:  [x]  See flow sheet: added posterior oblique sling phase II in half-lunge position   Rationale: increase strength, improve coordination, improve balance and increase proprioception  to improve the patients ability to stand for longer periods of time    8 min Manual Therapy:  SI check - seemingly (R) upslip corrected with shot gun MET, right LE long axis traction   Rationale: decrease pain, increase ROM, increase tissue extensibility and decrease trigger points to improve the patients ability to improve walking tolerance. The manual therapy interventions were performed at a separate and distinct time from the therapeutic activities interventions. with TE min Patient Education: [x] Review HEP     Other Objective/Functional Measures:     Pain Level (0-10 scale) post treatment: 0    ASSESSMENT/Changes in Function:   (+) hilda for hip flexor stiffness. Patient will continue to benefit from skilled PT services to modify and progress therapeutic interventions, address functional mobility deficits, address ROM deficits, address strength deficits, analyze and address soft tissue restrictions, analyze and cue movement patterns, analyze and modify body mechanics/ergonomics, assess and modify postural abnormalities and address imbalance/dizziness to attain remaining goals. [x]  See Plan of Care  []  See progress note/recertification  []  See Discharge Summary         Progress towards goals / Updated goals: · Short Term Goals: To be accomplished in  2  treatments:  1. Pt will be independent and compliant with HEP to decrease pain, increase ROM and return pt to PLOF. Status at last assessment: initiated   Current: goal progressing; modified today to improve comfort and subsequently compliance (5/3/21)  · Long Term Goals: To be accomplished in  8-10  treatments:  1.  Pt will increase score on the FOTO to > or = 69/100 to demo an increase in functional activity tolerance. Status at last assessment: 62/100   2. Pt will be able to self-manage ave pain to < or = 2/10 to improve ease of posture, mobility and self-care  Status at last assessment: ranges 3-7/10  3. Pt will have an increase in R hip flexion strength to > or = 4+/5 without pain to restore car, bed t/f and dressing   Status at last assessment: 3+ with p!   4. Pt will have an improvement in generalized LE strength and endurance to allow her to walk for > or = 45 min without an increase in pain   Status at last assessment: 20 min with p!      PLAN  [x]  Upgrade activities as tolerated     [x]  Continue plan of care  []  Update interventions per flow sheet       []  Discharge due to:_  []  Other:    Jacqulin Schilder, PTA, LPTA 5/19/2021 2:00 PM

## 2021-05-21 ENCOUNTER — HOSPITAL ENCOUNTER (OUTPATIENT)
Dept: PHYSICAL THERAPY | Age: 66
Discharge: HOME | End: 2021-05-21
Payer: MEDICARE

## 2021-05-21 PROCEDURE — 97112 NEUROMUSCULAR REEDUCATION: CPT

## 2021-05-21 PROCEDURE — 97140 MANUAL THERAPY 1/> REGIONS: CPT

## 2021-05-21 PROCEDURE — 97110 THERAPEUTIC EXERCISES: CPT

## 2021-05-21 NOTE — PROGRESS NOTES
PT DAILY TREATMENT NOTE     Patient Name: Garrick Killian  Date:2021  : 1955  [x]  Patient  Verified  Payor: Liban Bales / Plan: [place] MEDICARE PART A & B / Product Type: Medicare /    In time: 2:16 pm       Out time: 3:15 pm  Total Treatment Time (min): 59  Total Timed Codes (min): 49  1:1 Treatment Time (min): 49  Visit #: 5 of 8-10    Treatment Area: Right hip pain [M25.551]    SUBJECTIVE  Pain Level (0-10 scale): 0-1  Any medication changes, allergies to medications, adverse drug reactions, diagnosis change, or new procedure performed?: [x] No    [] Yes (see summary sheet for update)  Subjective functional status/changes:   [] No changes reported  Patient notes not doing too bad recently despite a lot of walking in [place].     OBJECTIVE  Modality rationale: decrease inflammation and decrease pain to improve the patients ability to perform ADLs     Min Type Additional Details    [] Estim: []Att   []Unatt                  []IFC  []Premod                                            []NMES    []Other:  []w/ice   []w/heat  Position:  Location:    []  Traction: [] Cervical       [] Lumbar                       [] Supine        [] Prone                       []Intermittent   []Continuous Lbs:  [] before manual  [] after manual    []  Ultrasound: []Continuous   [] Pulsed                           []1MHz   []3MHz Location:  W/cm2:    []  Iontophoresis with dexamethasone         Location: [] Take home patch   [] In clinic   10 [x]  Ice     []  heat  []  Ice massage Position: supine with LEs elevated on wedge  Location: (R) anterior hip    []  Vasopneumatic Device Pressure: [] lo [] med [] hi   Temp: [] lo [] med [] hi   [x] Skin assessment post-treatment:  [x]intact    []redness- no adverse reaction                                                                                 []redness  adverse reaction:     19 min Therapeutic Exercise:  [x] See flow sheet:    Rationale: increase ROM, increase strength and improve coordination to improve the patients ability to perform ADLs      17 min Neuromuscular Re-education:  [x]  See flow sheet:    Rationale: increase strength, improve coordination, improve balance and increase proprioception  to improve the patients ability to stand for longer periods of time    13 min Manual Therapy:  SI check - apparent (R) upslip, possible (R) anterior inn rotation with inflare; LE traction hold f/b MET to correct for (R) anterior inn rotation - minimal change; TPR to (R) psoas major in supine and glut max in prone   Rationale: decrease pain, increase ROM, increase tissue extensibility and decrease trigger points to improve the patients ability to improve walking tolerance. The manual therapy interventions were performed at a separate and distinct time from the therapeutic activities interventions. with TE min Patient Education: [x] Review HEP     Other Objective/Functional Measures:     Pain Level (0-10 scale) post treatment: 0    ASSESSMENT/Changes in Function:   Patient with seemingly recurrent right upslip which is difficult to correct with stretching alone. Improved static balance today. (+) for TTP at the right psoas major and gluteus major (adjacent to the SIJ) which responds well to soft-tissue manipulation. Patient will continue to benefit from skilled PT services to modify and progress therapeutic interventions, address functional mobility deficits, address ROM deficits, address strength deficits, analyze and address soft tissue restrictions, analyze and cue movement patterns, analyze and modify body mechanics/ergonomics, assess and modify postural abnormalities and address imbalance/dizziness to attain remaining goals. [x]  See Plan of Care  []  See progress note/recertification  []  See Discharge Summary         Progress towards goals / Updated goals: · Short Term Goals: To be accomplished in  2  treatments:  1.   Pt will be independent and compliant with HEP to decrease pain, increase ROM and return pt to PLOF. Status at last assessment: initiated   Current: goal progressing; modified today to improve comfort and subsequently compliance (5/3/21)  · Long Term Goals: To be accomplished in  8-10  treatments:  1. Pt will increase score on the FOTO to > or = 69/100 to demo an increase in functional activity tolerance. Status at last assessment: 62/100   2. Pt will be able to self-manage ave pain to < or = 2/10 to improve ease of posture, mobility and self-care  Status at last assessment: ranges 3-7/10  3. Pt will have an increase in R hip flexion strength to > or = 4+/5 without pain to restore car, bed t/f and dressing   Status at last assessment: 3+ with p!   4. Pt will have an improvement in generalized LE strength and endurance to allow her to walk for > or = 45 min without an increase in pain   Status at last assessment: 20 min with p!    Current: goal progressing; pt notes walking for long distances in [place] recently with minimal symptoms (5/21/21)    PLAN  [x]  Upgrade activities as tolerated     [x]  Continue plan of care  []  Update interventions per flow sheet       []  Discharge due to:_  [x]  Other: consider PA mobs to the T/S and L/S to improve upslip    Jermaine Landrum, PTA 5/21/2021

## 2021-05-24 ENCOUNTER — HOSPITAL ENCOUNTER (OUTPATIENT)
Dept: PHYSICAL THERAPY | Age: 66
Discharge: HOME | End: 2021-05-24
Payer: MEDICARE

## 2021-05-24 PROCEDURE — 97112 NEUROMUSCULAR REEDUCATION: CPT

## 2021-05-24 PROCEDURE — 97110 THERAPEUTIC EXERCISES: CPT

## 2021-05-24 PROCEDURE — 97140 MANUAL THERAPY 1/> REGIONS: CPT

## 2021-05-24 NOTE — PROGRESS NOTES
PT DAILY TREATMENT NOTE     Patient Name: Hua Schulte  Date:2021  : 1955  [x]  Patient  Verified  Payor: [place] MEDICARE / Plan: [place] MEDICARE PART A & B / Product Type: Medicare /    In time: 2:02 pm      Out time: 2:47 pm  Total Treatment Time (min): 45  Total Timed Codes (min): 45  1:1 Treatment Time (min): 45  Visit #: 6 of 8-10    Treatment Area: Right hip pain [M25.551]    SUBJECTIVE  Pain Level (0-10 scale): 1-2 \"ache not pain\"  Any medication changes, allergies to medications, adverse drug reactions, diagnosis change, or new procedure performed?: [x] No    [] Yes (see summary sheet for update)  Subjective functional status/changes:   [] No changes reported  Patient notes doing well despite recent increase in activity over the weekend. She notes most difficulty when lifting her leg up, such as when getting onto the upright bike.     OBJECTIVE  Modality rationale: decrease inflammation and decrease pain to improve the patients ability to perform ADLs     Min Type Additional Details    [] Estim: []Att   []Unatt                  []IFC  []Premod                                            []NMES    []Other:  []w/ice   []w/heat  Position:  Location:    []  Traction: [] Cervical       [] Lumbar                       [] Supine        [] Prone                       []Intermittent   []Continuous Lbs:  [] before manual  [] after manual    []  Ultrasound: []Continuous   [] Pulsed                           []1MHz   []3MHz Location:  W/cm2:    []  Iontophoresis with dexamethasone         Location: [] Take home patch   [] In clinic   PD [x]  Ice     []  heat  []  Ice massage Position: supine with LEs elevated on wedge  Location: (R) anterior hip    []  Vasopneumatic Device Pressure: [] lo [] med [] hi   Temp: [] lo [] med [] hi   [x] Skin assessment post-treatment:  [x]intact    []redness- no adverse reaction                                                                                 []redness  adverse reaction:     10 min Therapeutic Exercise:  [x] See flow sheet: added leg press   Rationale: increase ROM, increase strength and improve coordination to improve the patients ability to perform ADLs      18 min Neuromuscular Re-education:  [x]  See flow sheet:    Rationale: increase strength, improve coordination, improve balance and increase proprioception  to improve the patients ability to stand for longer periods of time    17 min Manual Therapy:  SI check - seemingly right upslip; prone PA mobs to T/S with cavitations present (non-painful); LE traction hold for the (R) LE   Rationale: decrease pain, increase ROM, increase tissue extensibility and decrease trigger points to improve the patients ability to improve walking tolerance. The manual therapy interventions were performed at a separate and distinct time from the therapeutic activities interventions. with TE min Patient Education: [x] Review HEP - provided right heel lift     Other Objective/Functional Measures:       Pain Level (0-10 scale) post treatment: 0    ASSESSMENT/Changes in Function:   Patient with recurrent (R) upslip, therefore, provided temporary heel lift to address. Patient notes no increase in pain with use of heel lift. Noted reduced height of hip extension with donkey kicks on the (R) LE versus the (L). Patient will continue to benefit from skilled PT services to modify and progress therapeutic interventions, address functional mobility deficits, address ROM deficits, address strength deficits, analyze and address soft tissue restrictions, analyze and cue movement patterns, analyze and modify body mechanics/ergonomics, assess and modify postural abnormalities and address imbalance/dizziness to attain remaining goals. [x]  See Plan of Care  []  See progress note/recertification  []  See Discharge Summary         Progress towards goals / Updated goals: · Short Term Goals: To be accomplished in  2  treatments:  1.   Pt will be independent and compliant with HEP to decrease pain, increase ROM and return pt to PLOF. Status at last assessment: initiated   Current: goal progressing; modified today to improve comfort and subsequently compliance (5/3/21)  · Long Term Goals: To be accomplished in  8-10  treatments:  1. Pt will increase score on the FOTO to > or = 69/100 to demo an increase in functional activity tolerance. Status at last assessment: 62/100   2. Pt will be able to self-manage ave pain to < or = 2/10 to improve ease of posture, mobility and self-care  Status at last assessment: ranges 3-7/10  3. Pt will have an increase in R hip flexion strength to > or = 4+/5 without pain to restore car, bed t/f and dressing   Status at last assessment: 3+ with p!   4. Pt will have an improvement in generalized LE strength and endurance to allow her to walk for > or = 45 min without an increase in pain   Status at last assessment: 20 min with p!    Current: goal progressing; pt notes walking for long distances in [place] recently with minimal symptoms (5/21/21)    PLAN  [x]  Upgrade activities as tolerated     [x]  Continue plan of care  []  Update interventions per flow sheet       []  Discharge due to:_  [x]  Other: assess response to use of heel lift    Jermaine Landrum, PTA 5/24/2021

## 2021-05-26 ENCOUNTER — HOSPITAL ENCOUNTER (OUTPATIENT)
Dept: PHYSICAL THERAPY | Age: 66
Discharge: HOME | End: 2021-05-26
Payer: MEDICARE

## 2021-05-26 PROCEDURE — 97110 THERAPEUTIC EXERCISES: CPT

## 2021-05-26 PROCEDURE — 97140 MANUAL THERAPY 1/> REGIONS: CPT

## 2021-05-26 PROCEDURE — 97112 NEUROMUSCULAR REEDUCATION: CPT

## 2021-05-26 NOTE — PROGRESS NOTES
PT DAILY TREATMENT NOTE     Patient Name: Marissa Pierce  Date:2021  : 1955  [x]  Patient  Verified  Payor: [place] MEDICARE / Plan: [place] MEDICARE PART A & B / Product Type: Medicare /    In time: 2:02 pm       Out time: 2:49 pm  Total Treatment Time (min): 47  Total Timed Codes (min): 47  1:1 Treatment Time (min): 42  Visit #: 7 of 8-10    Treatment Area: Right hip pain [M25.551]    SUBJECTIVE  Pain Level (0-10 scale): 2 stiffness, pt denies actual pain  Any medication changes, allergies to medications, adverse drug reactions, diagnosis change, or new procedure performed?: [x] No    [] Yes (see summary sheet for update)  Subjective functional status/changes:   [] No changes reported  Patient states she is sore from doing a lot of yardwork today.      OBJECTIVE  Modality rationale: decrease inflammation and decrease pain to improve the patients ability to perform ADLs     Min Type Additional Details    [] Estim: []Att   []Unatt                  []IFC  []Premod                                            []NMES    []Other:  []w/ice   []w/heat  Position:  Location:    []  Traction: [] Cervical       [] Lumbar                       [] Supine        [] Prone                       []Intermittent   []Continuous Lbs:  [] before manual  [] after manual    []  Ultrasound: []Continuous   [] Pulsed                           []1MHz   []3MHz Location:  W/cm2:    []  Iontophoresis with dexamethasone         Location: [] Take home patch   [] In clinic   PD [x]  Ice     []  heat  []  Ice massage Position: supine with LEs elevated on wedge  Location: (R) anterior hip    []  Vasopneumatic Device Pressure: [] lo [] med [] hi   Temp: [] lo [] med [] hi   [x] Skin assessment post-treatment:  [x]intact    []redness- no adverse reaction                                                                                 []redness  adverse reaction:     21 min Therapeutic Exercise:  [x] See flow sheet: assisted pt with FOTO completion; reassessment   Rationale: increase ROM, increase strength and improve coordination to improve the patients ability to perform ADLs      10 min Neuromuscular Re-education:  [x]  See flow sheet:    Rationale: increase strength, improve coordination, improve balance and increase proprioception  to improve the patients ability to stand for longer periods of time    16 min Manual Therapy: In left S/L; massage gun STM to (R) glut max and med; reassessment; SI check - seemingly right upslip   Rationale: decrease pain, increase ROM, increase tissue extensibility and decrease trigger points to improve the patients ability to improve walking tolerance. The manual therapy interventions were performed at a separate and distinct time from the therapeutic activities interventions. with TE min Patient Education: [x] Review HEP     Other Objective/Functional Measures:   Subjective improvement of 50% in symptoms since IE reported. Improvements: yardwork (pulling weeds, cutting grass), patient denies actual pain, less stiffness when walking for longer distances  Deficits: continued soreness/stiffness in the right hip with heavier activity and prolonged walking (1 mile +)  FOTO score: 66/100 (at last assessment, 62/100)  (R) hip AROM: flex 110 deg (symmetrical with contralateral leg in anatomical position), ER 34 deg (45 deg), IR 30 deg (32 deg)  (R) hip strength: flex 4-/5, abd 4/5, ext 4/5  Core strength: 80% bridge  SLS: (R) 20 sec, (L) 30 sec  Pain: (B) 0, (W) 2 \"stiffness\"    Pain Level (0-10 scale) post treatment: 0    ASSESSMENT/Changes in Function:   See PN.     Patient will continue to benefit from skilled PT services to modify and progress therapeutic interventions, address functional mobility deficits, address ROM deficits, address strength deficits, analyze and address soft tissue restrictions, analyze and cue movement patterns, analyze and modify body mechanics/ergonomics, assess and modify postural abnormalities and address imbalance/dizziness to attain remaining goals. []  See Plan of Care  [x]  See progress note/recertification (1/65/16)  []  See Discharge Summary         Progress towards goals / Updated goals:  1. Pt will increase score on the FOTO to > or = 69/100 to demo an increase in functional activity tolerance. Status at last assessment: 62/100   Current: goal progressing; 66/100  2. Pt will be able to self-manage ave pain to < or = 2/10 to improve ease of posture, mobility and self-care  Status at last assessment: ranges 3-7/10  Current: goal met; pt notes symptoms range 0-2/10 on average described as stiffness and soreness  3. Pt will have an increase in R hip flexion strength to > or = 4+/5 without pain to restore car, bed t/f and dressing   Status at last assessment: 3+ with p! Current: goal progressing; 4-/5 right hip flexion strength with less pain  4. Pt will have an improvement in generalized LE strength and endurance to allow her to walk for > or = 45 min without an increase in pain   Status at last assessment: 20 min with p!    Current: goal progressing; pt notes walking for long distances in [place] with minimal symptoms, although notes cont'd 15-20 minute walking tolerance    PLAN  [x]  Upgrade activities as tolerated     [x]  Continue plan of care  []  Update interventions per flow sheet       []  Discharge due to:_  [x]  Other: see PN; cont 1-2x4    Juliaguido Katya, PTA 5/26/2021

## 2021-05-26 NOTE — PROGRESS NOTES
[address] [facility] [address] [facility] AT [address]. [address]  Phone: [phone] Fax: [phone]  PROGRESS NOTE  Patient Name: Mary Fajardo : 1955   Treatment/Medical Diagnosis: Right hip pain [M25.551]   Referral Source: Jaycee Wu, *     Date of Initial Visit: 21 Attended Visits: 7 Missed Visits: 0     SUMMARY OF TREATMENT  Pt is a 78 yo female s/p Anterior approach THR in 2019 with unyielding R hip flexor tendonitis. Treatment has consisted of the following: Therapeutic exercise, Neuromuscular re-education, Physical agent/modality, Gait/balance training, Manual therapy, Patient education, and Self Care training. CURRENT STATUS  Patient is making steady progress in PT. Subjective improvement of 50% in symptoms since IE reported. Assessment as follows:  Improvements: yardwork (pulling weeds, cutting grass), patient denies actual pain, less stiffness when walking for longer distances  Deficits: continued soreness/stiffness in the right hip with heavier activity and prolonged walking (1 mile +)  FOTO score: 66/100 (at last assessment, 62/100)  (R) hip AROM: flex 110 deg (symmetrical with contralateral leg in anatomical position), ER 34 deg (45 deg), IR 30 deg (32 deg)  (R) hip strength: flex 4-/5, abd 4/5, ext 4/5  Core strength: 80% bridge  SLS: (R) 20 sec, (L) 30 sec  Pain: (B) 0, (W) 2 \"stiffness\"    Goal/Measure of Progress   1. Pt will increase score on the FOTO to > or = 69/100 to demo an increase in functional activity tolerance. Status at last assessment: 62/100   Current: goal progressing; 66/100  2.   Pt will be able to self-manage ave pain to < or = 2/10 to improve ease of posture, mobility and self-care  Status at last assessment: ranges 3-7/10  Current: goal met; pt notes symptoms range 0-2/10 on average described as stiffness and soreness  3.  Pt will have an increase in R hip flexion strength to > or = 4+/5 without pain to restore car, bed t/f and dressing   Status at last assessment: 3+ with p!   Current: goal progressing; 4-/5 right hip flexion strength with less pain  4.  Pt will have an improvement in generalized LE strength and endurance to allow her to walk for > or = 45 min without an increase in pain   Status at last assessment: 20 min with p!   Current: goal progressing; pt notes walking for long distances in [place] with minimal symptoms, although notes cont'd 15-20 minute walking tolerance    New Goals to be achieved in __4__  treatments:  Continue per established goals. RECOMMENDATIONS  Recommend con't PT interventions 1-2x/week for 4 sessions. If you have any questions/comments please contact us directly at [phone]. Thank you for allowing us to assist in the care of your patient. LPTA Signature: Jeanmarie Fair  Date: 5/26/2021   PT Signature: Chiquita Gill DPT Time: 6:59 PM   NOTE TO PHYSICIAN:  PLEASE COMPLETE THE ORDERS BELOW AND FAX TO   [facility]: [phone]. If you are unable to process this request in 24 hours please contact our office: [phone].  ___ I have read the above report and request that my patient continue as recommended.   ___ I have read the above report and request that my patient continue therapy with the following changes/special instructions:_________________________________________________________   ___ I have read the above report and request that my patient be discharged from therapy.      Physician Signature:        Date:       Time:   Reporting Period: 4/29/21-5/26/21

## 2021-06-04 ENCOUNTER — APPOINTMENT (OUTPATIENT)
Dept: PHYSICAL THERAPY | Age: 66
End: 2021-06-04
Payer: MEDICARE

## 2021-06-08 ENCOUNTER — HOSPITAL ENCOUNTER (OUTPATIENT)
Dept: PHYSICAL THERAPY | Age: 66
Discharge: HOME | End: 2021-06-08
Payer: MEDICARE

## 2021-06-08 PROCEDURE — 97110 THERAPEUTIC EXERCISES: CPT | Performed by: PHYSICAL THERAPIST

## 2021-06-08 PROCEDURE — 97112 NEUROMUSCULAR REEDUCATION: CPT | Performed by: PHYSICAL THERAPIST

## 2021-06-08 PROCEDURE — 97140 MANUAL THERAPY 1/> REGIONS: CPT | Performed by: PHYSICAL THERAPIST

## 2021-06-08 NOTE — PROGRESS NOTES
PT DAILY TREATMENT NOTE     Patient Name: Manolo Spencer  Date:2021  : 1955  [x]  Patient  Verified  Payor: Shree Bacon / Plan: [place] / Product Type: Medicare /    In time: 203 pm       Out time: 253 pm  Total Treatment Time (min): 50  Total Timed Codes (min): 50  1:1 Treatment Time (min): 40  Visit #: 1 of 8-10    Treatment Area: Right hip pain [M25.551]    SUBJECTIVE  Pain Level (0-10 scale): 2   Any medication changes, allergies to medications, adverse drug reactions, diagnosis change, or new procedure performed?: [x] No    [] Yes (see summary sheet for update)  Subjective functional status/changes:   [] No changes reported  Some days it just aches and some days it doesn't. My outside hip just aches, and the groin when I go to lift leg over other leg.     OBJECTIVE  Modality rationale: decrease inflammation and decrease pain to improve the patients ability to perform ADLs     Min Type Additional Details    [] Estim: []Att   []Unatt                  []IFC  []Premod                                            []NMES    []Other:  []w/ice   []w/heat  Position:  Location:    []  Traction: [] Cervical       [] Lumbar                       [] Supine        [] Prone                       []Intermittent   []Continuous Lbs:  [] before manual  [] after manual    []  Ultrasound: []Continuous   [] Pulsed                           []1MHz   []3MHz Location:  W/cm2:    []  Iontophoresis with dexamethasone         Location: [] Take home patch   [] In clinic   PD [x]  Ice     []  heat  []  Ice massage Position: supine with LEs elevated on wedge  Location: (R) anterior hip    []  Vasopneumatic Device Pressure: [] lo [] med [] hi   Temp: [] lo [] med [] hi   [x] Skin assessment post-treatment:  [x]intact    []redness- no adverse reaction                                                                                 []redness  adverse reaction:     30/20 min Therapeutic Exercise:  [x] See flow sheet: Rationale: increase ROM, increase strength and improve coordination to improve the patients ability to perform ADLs      10 min Neuromuscular Re-education:  [x]  See flow sheet:    Rationale: increase strength, improve coordination, improve balance and increase proprioception  to improve the patients ability to stand for longer periods of time    10 min Manual Therapy: In left S/L; massage gun STM to (R) glut max and med; reassessment; SI check - seemingly right upslip   Rationale: decrease pain, increase ROM, increase tissue extensibility and decrease trigger points to improve the patients ability to improve walking tolerance. The manual therapy interventions were performed at a separate and distinct time from the therapeutic activities interventions. with TE min Patient Education: [x] Review HEP     Other Objective/Functional Measures:   R hip ABD in SL isometric added for chronic tendonitis complaints  R clams in SL with two pillows between legs. Pain Level (0-10 scale) post treatment:1- 2 no worse    ASSESSMENT/Changes in Function:   First FU since PN. Patient instructed in SL and standing activities of isometric holds up to 30 sec for hip bursitis/tendonitis. Educated in reducing ADD stress to joint with avoiding crossing legs and hanging on hip in standing, as well as performing exercises from an ADD position. Severe TTP at lateral R hip and Glut med TrP noted. Patient will continue to benefit from skilled PT services to modify and progress therapeutic interventions, address functional mobility deficits, address ROM deficits, address strength deficits, analyze and address soft tissue restrictions, analyze and cue movement patterns, analyze and modify body mechanics/ergonomics, assess and modify postural abnormalities and address imbalance/dizziness to attain remaining goals.      []  See Plan of Care  [x]  See progress note/recertification (9/82/50)  []  See Discharge Summary Progress towards goals / Updated goals:  Goal/Measure of Progress   1.  Pt will increase score on the FOTO to > or = 69/100 to demo an increase in functional activity tolerance. Status at last assessment: 62/100   Current: goal progressing; 66/100  2.  Pt will be able to self-manage ave pain to < or = 2/10 to improve ease of posture, mobility and self-care  Status at last assessment: ranges 3-7/10  Current: goal met; pt notes symptoms range 0-2/10 on average described as stiffness and soreness  3.  Pt will have an increase in R hip flexion strength to > or = 4+/5 without pain to restore car, bed t/f and dressing   Status at last assessment: 3+ with p!   Current: goal progressing; 4-/5 right hip flexion strength with less pain  4.  Pt will have an improvement in generalized LE strength and endurance to allow her to walk for > or = 45 min without an increase in pain   Status at last assessment: 20 min with p!   Current: goal progressing; pt notes walking for long distances in [place] with minimal symptoms, although notes cont'd 15-20 minute walking tolerance  PLAN  [x]  Upgrade activities as tolerated     [x]  Continue plan of care  []  Update interventions per flow sheet       []  Discharge due to:_  [x]  Other: assess if pt performing isometric therex at home.      Geovany Oakes, PT 6/8/2021

## 2021-06-15 ENCOUNTER — HOSPITAL ENCOUNTER (OUTPATIENT)
Dept: PHYSICAL THERAPY | Age: 66
Discharge: HOME | End: 2021-06-15
Payer: MEDICARE

## 2021-06-15 PROCEDURE — 97112 NEUROMUSCULAR REEDUCATION: CPT

## 2021-06-15 PROCEDURE — 97110 THERAPEUTIC EXERCISES: CPT

## 2021-06-15 PROCEDURE — 97140 MANUAL THERAPY 1/> REGIONS: CPT

## 2021-06-15 NOTE — PROGRESS NOTES
PT DAILY TREATMENT NOTE     Patient Name: Lakia Harrell  Date:6/15/2021  : 1955  [x]  Patient  Verified  Payor: Virginia Soto / Plan: [place] PART [place] & [place] / Product Type: Medicare /    In time: 7:00 am          Out time: 8:07 am  Total Treatment Time (min): 67  Total Timed Codes (min): 57  1:1 Treatment Time (min): 57  Visit #: 2 of 8-10    Treatment Area: Right hip pain [M25.551]     SUBJECTIVE  Pain Level (0-10 scale): 2  Any medication changes, allergies to medications, adverse drug reactions, diagnosis change, or new procedure performed?: [x] No    [] Yes (see summary sheet for update)  Subjective functional status/changes:   [] No changes reported  Patient states she has been really active, standing for long periods of time, and has some outer thigh pain. She states she has been doing her isometric holds at home.     OBJECTIVE  Modality rationale: decrease inflammation and decrease pain to improve the patients ability to perform ADLs     Min Type Additional Details    [] Estim: []Att   []Unatt                  []IFC  []Premod                                            []NMES    []Other:  []w/ice   []w/heat  Position:  Location:    []  Traction: [] Cervical       [] Lumbar                       [] Supine        [] Prone                       []Intermittent   []Continuous Lbs:  [] before manual  [] after manual    []  Ultrasound: []Continuous   [] Pulsed                           []1MHz   []3MHz Location:  W/cm2:    []  Iontophoresis with dexamethasone         Location: [] Take home patch   [] In clinic   10 [x]  Ice     []  heat  []  Ice massage Position: (L) side-lying with three pillows between legs  Location: (R) lateral hip and glut medius    []  Vasopneumatic Device Pressure: [] lo [] med [] hi   Temp: [] lo [] med [] hi   [x] Skin assessment post-treatment:  [x]intact    []redness- no adverse reaction                                                                                 []redness  adverse reaction:     31 min Therapeutic Exercise:  [x] See flow sheet:    Rationale: increase ROM, increase strength and improve coordination to improve the patients ability to perform ADLs      12 min Neuromuscular Re-education:  [x]  See flow sheet:    Rationale: increase strength, improve coordination, improve balance and increase proprioception  to improve the patients ability to stand for longer periods of time    14 min Manual Therapy: In left S/L with three pillows between legs; TPR to (R) glut med, stick-rolling STM to (R) ITB f/b MFR, (R) midfoot mobs f/b toe distraction with cavitation present on the 4th digit, STM to PF with pin and stretch technique   Rationale: decrease pain, increase ROM, increase tissue extensibility and decrease trigger points to improve the patients ability to improve walking tolerance. The manual therapy interventions were performed at a separate and distinct time from the therapeutic activities interventions. with TE min Patient Education: [x] Review HEP     Other Objective/Functional Measures:       Pain Level (0-10 scale) post treatment: 1    ASSESSMENT/Changes in Function:   Patient with s/s consistent with glut tendinitis today. Notes no pain in the hip flexor or groin. Significant TTP at the (R) glut medius, likely due to increased use and soreness after exercise. Encouraged self-massage using a lacrosse or tennis ball. Patient will continue to benefit from skilled PT services to modify and progress therapeutic interventions, address functional mobility deficits, address ROM deficits, address strength deficits, analyze and address soft tissue restrictions, analyze and cue movement patterns, analyze and modify body mechanics/ergonomics, assess and modify postural abnormalities and address imbalance/dizziness to attain remaining goals.      []  See Plan of Care  [x]  See progress note/recertification (6/38/61)  []  See Discharge Summary         Progress towards goals / Updated goals:  Goal/Measure of Progress   1.  Pt will increase score on the FOTO to > or = 69/100 to demo an increase in functional activity tolerance.   Status at last assessment: 62/100   Current: goal progressing; 66/100   2.  Pt will be able to self-manage ave pain to < or = 2/10 to improve ease of posture, mobility and self-care  Status at last assessment: ranges 3-7/10  Current: goal met; pt notes symptoms range 0-2/10 on average described as stiffness and soreness  3.  Pt will have an increase in R hip flexion strength to > or = 4+/5 without pain to restore car, bed t/f and dressing   Status at last assessment: 3+ with p!   Current: goal progressing; 4-/5 right hip flexion strength with less pain  4.  Pt will have an improvement in generalized LE strength and endurance to allow her to walk for > or = 45 min without an increase in pain   Status at last assessment: 20 min with p!   Current: goal progressing; pt notes walking for long distances in [place] with minimal symptoms, although notes cont'd 15-20 minute walking tolerance    PLAN  [x]  Upgrade activities as tolerated     [x]  Continue plan of care  []  Update interventions per flow sheet       []  Discharge due to:_  []  Other:_    Jacqulin Schilder, PTA 6/15/2021

## 2021-06-23 ENCOUNTER — HOSPITAL ENCOUNTER (OUTPATIENT)
Dept: PHYSICAL THERAPY | Age: 66
Discharge: HOME | End: 2021-06-23
Payer: MEDICARE

## 2021-06-23 PROCEDURE — 97140 MANUAL THERAPY 1/> REGIONS: CPT

## 2021-06-23 PROCEDURE — 97110 THERAPEUTIC EXERCISES: CPT

## 2021-06-23 NOTE — PROGRESS NOTES
PT DAILY TREATMENT NOTE     Patient Name: Jazzmine Gallegos  Date:2021  : 1955  [x]  Patient  Verified  Payor: Kristan Gaming / Plan: [place] PART [place] / Product Type: Medicare /    In time: 12:15 pm          Out time: 1:00 pm  Total Treatment Time (min): 45  Total Timed Codes (min): 35  1:1 Treatment Time (min): 35  Visit #: 3 of 8-10    Treatment Area: Right hip pain [M25.551]     SUBJECTIVE  Pain Level (0-10 scale): 1-2  Any medication changes, allergies to medications, adverse drug reactions, diagnosis change, or new procedure performed?: [x] No    [] Yes (see summary sheet for update)  Subjective functional status/changes:   [] No changes reported  Patient notes continued discomfort and fatigue in the right hip.     OBJECTIVE  Modality rationale: decrease inflammation and decrease pain to improve the patients ability to perform ADLs     Min Type Additional Details    [] Estim: []Att   []Unatt                  []IFC  []Premod                                            []NMES    []Other:  []w/ice   []w/heat  Position:  Location:    []  Traction: [] Cervical       [] Lumbar                       [] Supine        [] Prone                       []Intermittent   []Continuous Lbs:  [] before manual  [] after manual    []  Ultrasound: []Continuous   [] Pulsed                           []1MHz   []3MHz Location:  W/cm2:    []  Iontophoresis with dexamethasone         Location: [] Take home patch   [] In clinic   10 [x]  Ice     []  heat  []  Ice massage Position: prone  Location: L/S and T/S    []  Vasopneumatic Device Pressure: [] lo [] med [] hi   Temp: [] lo [] med [] hi   [x] Skin assessment post-treatment:  [x]intact    []redness- no adverse reaction                                                                                 []redness  adverse reaction:     23 min Therapeutic Exercise:  [x] See flow sheet:    Rationale: increase ROM, increase strength and improve coordination to improve the patients ability to perform ADLs     12 min Manual Therapy: prone STM to (B) TPS, LPS, and QL in prone f/b MFR; prone PA mobs to the T/S and L/S with multiple cavitations present   Rationale: decrease pain, increase ROM, increase tissue extensibility and decrease trigger points to improve the patients ability to improve walking tolerance. The manual therapy interventions were performed at a separate and distinct time from the therapeutic activities interventions. with TE min Patient Education: [x] Review HEP - standing lumbar extension, wall L/S extension, 1/2 FR hook-lying T/S extension     Other Objective/Functional Measures:   Spinal extension, in standin% AROM (post-MT 75% AROM)    Pain Level (0-10 scale) post treatment: 0 in hip    ASSESSMENT/Changes in Function:   Patient responding well to mobilizations of the T/S and L/S to restore mobility and subsequent improvement of spinal rotation required for normalized gait. Notes no hip pain following today's treatment and patient was able to demo proper trunk rotation during departure from clinic. Patient will continue to benefit from skilled PT services to modify and progress therapeutic interventions, address functional mobility deficits, address ROM deficits, address strength deficits, analyze and address soft tissue restrictions, analyze and cue movement patterns, analyze and modify body mechanics/ergonomics, assess and modify postural abnormalities and address imbalance/dizziness to attain remaining goals. []  See Plan of Care  [x]  See progress note/recertification ()  []  See Discharge Summary         Progress towards goals / Updated goals:  Goal/Measure of Progress   1.  Pt will increase score on the FOTO to > or = 69/100 to demo an increase in functional activity tolerance.   Status at last assessment: 62/100   Current: goal progressing; 66/100   2.  Pt will be able to self-manage ave pain to < or = 2/10 to improve ease of posture, mobility and self-care  Status at last assessment: ranges 3-7/10  Current: goal met; pt notes symptoms range 0-2/10 on average described as stiffness and soreness  3.  Pt will have an increase in R hip flexion strength to > or = 4+/5 without pain to restore car, bed t/f and dressing   Status at last assessment: 3+ with p!   Current: goal progressing; 4-/5 right hip flexion strength with less pain  4.  Pt will have an improvement in generalized LE strength and endurance to allow her to walk for > or = 45 min without an increase in pain   Status at last assessment: 20 min with p!   Current: goal progressing; pt notes walking for long distances in [place] with minimal symptoms, although notes cont'd 15-20 minute walking tolerance    PLAN  [x]  Upgrade activities as tolerated     [x]  Continue plan of care  []  Update interventions per flow sheet       []  Discharge due to:_  [x]  Other: reassessment due GAURI Maier, PTA 6/23/2021

## 2021-06-30 ENCOUNTER — HOSPITAL ENCOUNTER (OUTPATIENT)
Dept: PHYSICAL THERAPY | Age: 66
Discharge: HOME | End: 2021-06-30
Payer: MEDICARE

## 2021-06-30 PROCEDURE — 97140 MANUAL THERAPY 1/> REGIONS: CPT

## 2021-06-30 PROCEDURE — 97110 THERAPEUTIC EXERCISES: CPT

## 2021-06-30 NOTE — PROGRESS NOTES
PT DAILY TREATMENT NOTE     Patient Name: Agueda Helm  Date:2021  : 1955  [x]  Patient  Verified  Payor: [place] MED[place]ARE / Plan: [place] MEDICARE PART A & B / Product Type: Medicare /    In time: 12:13 pm             Out time: 1:08 pm  Total Treatment Time (min): 55  Total Timed Codes (min): 55  1:1 Treatment Time (min): 55  Visit #: 4 of 8-10    Treatment Area: Right hip pain [M25.551]     SUBJECTIVE  Pain Level (0-10 scale): 0  Any medication changes, allergies to medications, adverse drug reactions, diagnosis change, or new procedure performed?: [x] No    [] Yes (see summary sheet for update)  Subjective functional status/changes:   [] No changes reported  Patient reports significantly less discomfort since last session. She states she ordered a half foam roll and is awaiting its delivery.     OBJECTIVE  Modality rationale: decrease inflammation and decrease pain to improve the patients ability to perform ADLs     Min Type Additional Details    [] Estim: []Att   []Unatt                  []IFC  []Premod                                            []NMES    []Other:  []w/ice   []w/heat  Position:  Location:    []  Traction: [] Cervical       [] Lumbar                       [] Supine        [] Prone                       []Intermittent   []Continuous Lbs:  [] before manual  [] after manual    []  Ultrasound: []Continuous   [] Pulsed                           []1MHz   []3MHz Location:  W/cm2:    []  Iontophoresis with dexamethasone         Location: [] Take home patch   [] In clinic   PD [x]  Ice     []  heat  []  Ice massage Position: prone  Location: L/S and T/S    []  Vasopneumatic Device Pressure: [] lo [] med [] hi   Temp: [] lo [] med [] hi   [x] Skin assessment post-treatment:  [x]intact    []redness- no adverse reaction                                                                                 []redness  adverse reaction:     42 min Therapeutic Exercise:  [x] See flow sheet: reassessment Rationale: increase ROM, increase strength and improve coordination to improve the patients ability to perform ADLs     13 min Manual Therapy: prone STM to (B) TPS, LPS, and QL in prone f/b MFR; prone PA mobs to the T/S and L/S with multiple cavitations present, stick-rolling STM to the (R) VL and RF   Rationale: decrease pain, increase ROM, increase tissue extensibility and decrease trigger points to improve the patients ability to improve walking tolerance. The manual therapy interventions were performed at a separate and distinct time from the therapeutic activities interventions. with TE min Patient Education: [x] Review HEP for D/C     Other Objective/Functional Measures:   Improvements: walking for longer distances, less pain with yardwork, improved perceived posture and gait quality, stair ascent  FOTO score: 78/100 (at last assessment, 66/100)  (R) hip AROM: flex 110 deg (symmetrical, with contralateral LE in anatomical position), abd 44 deg, ext 10 deg  (R) hip strength: flex 4/5, abd 4+/5, ext 4/5  L/S AROM (%): ext 90%, (R)%, (L)%  Core strength: 100% bridge  Pain: (A) 0, (B) 0, (W) 1    Pain Level (0-10 scale) post treatment: 0    ASSESSMENT/Changes in Function:   See D/C. Patient will continue to benefit from skilled PT services to modify and progress therapeutic interventions, address functional mobility deficits, address ROM deficits, address strength deficits, analyze and address soft tissue restrictions, analyze and cue movement patterns, analyze and modify body mechanics/ergonomics, assess and modify postural abnormalities and address imbalance/dizziness to attain remaining goals. [x]  See Discharge Summary         Progress towards goals / Updated goals:  Goal/Measure of Progress   1.  Pt will increase score on the FOTO to > or = 69/100 to demo an increase in functional activity tolerance.   Status at last assessment: 66/100   Current: goal met; 78/100  2.  Pt will have an increase in R hip flexion strength to > or = 4+/5 without pain to restore car, bed t/f and dressing   Status at last assessment: 3+ with p!   Current: goal progressing; flexion 4/5  3.  Pt will have an improvement in generalized LE strength and endurance to allow her to walk for > or = 45 min without an increase in pain   Status at last assessment: 20 min with p!   Current: goal met; pt notes increased walking tolerance overall since last session, no difficulty with extended periods of walking over the weekend    PLAN    [x]  Discharge due to: program complete    Bakari Hollingsworth, PTA 6/30/2021

## 2021-06-30 NOTE — PROGRESS NOTES
Physical Therapy Discharge Instructions      [facility] [address]  [phone] fax      Patient: Ted Rivers  : 1955      Continue Home Exercise Program 1-2 times per day for 4 weeks, then decrease to 3 times per week      Continue with    [x] Ice  as needed     [] Heat           Follow up with MD:     [] Upon completion of therapy     [x] As needed      Recommendations:     [x]   Return to activity with home program    []   Return to activity with the following modifications:       []Post Rehab Program    []Join Independent aquatic program     []Return to/join local gym        Additional Comments: You are doing great! Keep up the good work!           Ty Landrum, PTA  2021

## 2021-07-08 NOTE — PROGRESS NOTES
[address] [facility] [address] [facility] AT [address]. [facility] [address]     Phone: [phone] Fax: 21 [phone] SUMMARY  Patient Name: Mathew Shelley : 1955   Treatment/Medical Diagnosis: Right hip pain [M25.551]   Referral Source: Jonelle Kruse, *     Date of Initial Visit: 21 Attended Visits: 11 Missed Visits: 0     SUMMARY OF TREATMENT  Pt is a 65 yo female s/p Anterior approach THR in 2019 with unyielding R hip flexor tendonitis. Treatment has consisted of the following: Therapeutic exercise, Neuromuscular re-education, Physical agent/modality, Gait/balance training, Manual therapy, Patient education, and Self Care training. CURRENT STATUS  Patient has made excellent progress in PT, stating she has less soreness after performing all yardwork. Assessment as follows:  Improvements: walking for longer distances, less pain with yardwork, improved perceived posture and gait quality, stair ascent  FOTO score: 78/100 (at last assessment, 66/100)  (R) hip AROM: flex 110 deg (symmetrical, with contralateral LE in anatomical position), abd 44 deg, ext 10 deg  (R) hip strength: flex 4/5, abd 4+/5, ext 4/5  L/S AROM (%): ext 90%, (R)%, (L)%  Core strength: 100% bridge  Pain: (A) 0, (B) 0, (W) 1    Goal/Measure of Progress:  1.  Pt will increase score on the FOTO to > or = 69/100 to demo an increase in functional activity tolerance.   Status at last assessment: 66/100   Current: goal met; 78/100  2.  Pt will have an increase in R hip flexion strength to > or = 4+/5 without pain to restore car, bed t/f and dressing   Status at last assessment: 3+ with p!   Current: goal progressing; flexion 4/5  3.  Pt will have an improvement in generalized LE strength and endurance to allow her to walk for > or = 45 min without an increase in pain   Status at last assessment: 20 min with p!   Current: goal met; pt notes increased walking tolerance overall since last session, no difficulty with extended periods of walking over the weekend    Home exercise program established on initial evaluation and progressed as patient is able to address deficits. Patient now has all of the knowledge to continue with progress per HEP. RECOMMENDATIONS  Discontinue therapy. Progressing towards or have reached established goals. If you have any questions/comments please contact us directly at [phone]. Thank you for allowing us to assist in the care of your patient.     LPTA Signature: Jeanmarie Lock Date: 7/8/21   Therapist Signature: Clover Martinez DPT Time: 3:11 PM   Reporting Period:  4/29/21-6/20/21

## 2022-01-13 ENCOUNTER — HOSPITAL ENCOUNTER (OUTPATIENT)
Dept: PHYSICAL THERAPY | Age: 67
Discharge: HOME | End: 2022-01-13
Payer: MEDICARE

## 2022-01-13 PROCEDURE — 97162 PT EVAL MOD COMPLEX 30 MIN: CPT

## 2022-01-13 NOTE — PROGRESS NOTES
[address] [facility] [address] [place], [place]  Phone: [phone] Fax: [phone] / [address] [address] PHYSICAL THERAPY SERVICES  Patient Name: Dinorah Thomas : 1955   Medical   Diagnosis: Other low back pain [M54.59] Treatment Diagnosis: LBP   Onset Date: Ongoing > 2 years      Referral Source: Macrina Joy MD Start of Care [facility]): 2022   Prior Hospitalization: See medical history Provider #: [id number]   Prior Level of Function: Progressing weakness in the R Leg over 2 years    Comorbidities: R knee pain; R hip replacement 2019 with residual hip flexion weakness and pain    Medications: Verified on Patient Summary List   The Plan of Care and following information is based on the information from the initial evaluation.   ========================================================================  Assessment / key information:  Pt is a 78 yo female with ongoing R glute, intermittent back and buttox pain prior to R THR and then worsening/not resolving after joint replacement. Previous rx has included the following: PT, xray for lumbar spine, MRI. Sitting with use of heating pad; 2x injection with pain relief 1x . Per pt report there is some spondylolisthesis and would not be a candidate for a simple surgery. She presents with pain ranging from 1-6/10, located R lateral glute, buttox, lumbar spine. Pain is made worse with walking > 15 min (increases the back and buttox pain); sleeping on the R side  Pain is made better with forward folding, sitting. Sleeping position: prone and L SL  Functional limitations include: lifting, walking  15 min, standing at the sink slightly flexed; walking up stairs (uses non-reciprocal patterning). Notes unlimited sitting tolerance; no pain with sitting workouts (sitting eliptical); no pain with bed mobility. '    Denies red flags, Denies Bowel and bladder sxs. Pt has no c/o radicular sxs or paresthesias. She presents with no repeated movement response, but possible utilization of flexion with L bias to improve longer duration walking tolerance. Pt has sxs of glute med tendinoplasty and generalized R hip and leg mm weakness contributing to altered balance, walking, stairs mechanics and would benefit from PT to assess deficits.   ========================================================================  Eval Complexity: History: MEDIUM  Complexity : 1-2 comorbidities / personal factors will impact the outcome/ POC Exam:HIGH Complexity : 4+ Standardized tests and measures addressing body structure, function, activity limitation and / or participation in recreation  Presentation: MEDIUM Complexity : Evolving with changing characteristics  Clinical Decision Making:MEDIUM Complexity : FOTO score of 26-74Overall Complexity:MEDIUM  Problem List: pain affecting function, decrease ROM, decrease strength, impaired gait/ balance, decrease ADL/ functional abilitiies, decrease activity tolerance, decrease flexibility/ joint mobility and decrease transfer abilities   Treatment Plan may include any combination of the following: Therapeutic exercise, Therapeutic activities, Neuromuscular re-education, Physical agent/modality, Gait/balance training, Manual therapy, Aquatic therapy, Patient education, Self Care training, Functional mobility training, Home safety training and Stair training; IONTOPHORESIS WITH DEXAMETHASONE  Patient / Family readiness to learn indicated by: asking questions, trying to perform skills and interest  Persons(s) to be included in education: patient (P)  Barriers to Learning/Limitations: None  Measures taken:    Patient Goal (s): \"4-5 things that I can do daily that increase my mobility and decrease my pain\"   Patient self reported health status: good  Rehabilitation Potential: good   Short Term Goals: To be accomplished in  2  treatments:  1.   Pt will be independent and compliant with HEP to decrease pain, increase ROM and return pt to PLOF. Status at last assessment: initiate at IE   Long Term Goals: To be accomplished in  8-10  treatments:  1. Pt will increase score on the FOTO to > or = 64/100 to demo an increase in functional activity tolerance. Status at last assessment: 54/100  2. Pt will be able to self-manage ave pain to < or = 2/10 ave  to improve ease of posture, mobility and self-care  Status at last assessment: ranges 1-6/10  3. Pt will have an increase in R hip and quad MMT to > or = 4+/5 all planes to improve mechanics for standing, balance, gait and ADLs. Status at last assessment:  R LE: hip flexion 3+/5 p!; abd 3+/5 p!, IR 3+/5 with cramping, ER 3+/5 with pain; extension (knee flexion) 4-/5, ext with knee extended 4-/5; Quad la deg (B) but lacking good quad contraction on the R; HS: 5/5  4. Pt will be able to walk for > or = 30 min with no increase in pain to restore PLOF  Status at last assessment: 15 min with pain in the R posterior hip   Frequency / Duration:   Patient to be seen  2  times per week for 8-10  treatments:  (All LTG as above will be assessed and updated every 10 visits or 30 days and progressed as needed)  Patient / Caregiver education and instruction: self care, activity modification and exercises  Therapist Signature: Michael Sinha DPT Date:    Certification Period: 22 to 22- Time: 9:36 AM   ========================================================================  I certify that the above Physical Therapy Services are being furnished while the patient is under my care. I agree with the treatment plan and certify that this therapy is necessary. Physician Signature:        Date:       Time:   Ksenia Valenzuela MD  Please sign and return to [facility] or you may fax the signed copy to [phone]. Thank you.

## 2022-01-13 NOTE — PROGRESS NOTES
PT DAILY TREATMENT NOTE     Patient Name: Adrienne Chavez  Date:2022  : 1955  [x]  Patient  Verified  Payor: Yomi Grew / Plan: [place] / Product Type: Medicare /    In time:10:47  Out time:11:30  Total Treatment Time (min): 43  Total Timed Codes (min): 43  1:1 Treatment Time (min): 43   Visit #: 1 of 8-10    Treatment Area:  Other low back pain [M54.59]    SUBJECTIVE  Pain Level (0-10 scale): 1-2  Any medication changes, allergies to medications, adverse drug reactions, diagnosis change, or new procedure performed?: [x] No    [] Yes (see summary sheet for update)  Subjective functional status/changes:   [] No changes reported  SEE IE for Subjective Information    OBJECTIVE  Modality rationale: decrease inflammation and decrease pain to improve the patients ability to improve gait, ADLs    Min Type Additional Details    [] Estim: []Att   []Unatt  []TENS instruct                 []IFC  []Premod []NMES                       []Other:  []w/US   []w/ice   []w/heat  Position:  Location:    []  Traction: [] Cervical       []Lumbar                       [] Prone          []Supine                       []Intermittent   []Continuous Lbs:  [] before manual  [] after manual    []  Ultrasound: []Continuous   [] Pulsed                           []1MHz   []3MHz Location:  W/cm2:   NV [x]  Iontophoresis with dexamethasone         Location: R greater trochanter [x] Take home patch   [] In clinic    []  Ice     []  heat  []  Ice massage Position:  Location:    []  Vasopneumatic Device:  If using vaso (only need to measure limb vaso being performed on)      pre-treatment girth :       post-treatment girth :       measured at (landmark location)  Pressure: [] lo [] med [] hi   Temp: [] lo [] med [] hi   [x] Skin assessment post-treatment:  [x]intact []redness- no adverse reaction       []redness  adverse reaction:     Vasopnuematic compression justification:  Per bilateral girth measures taken and listed above the edema is considered significant and having an impact on the patient's functional mobility and self-care          x min Patient Education: [x] Review HEP    [] Progressed/Changed HEP based on:   [] positioning   [] body mechanics   [] transfers   [] heat/ice application        Other Objective/Functional Measures:     Symptoms  Aggravated by: see functional limitations      OBJECTIVE    Active Movements: see below:  ROM % LS Comments:pain, area   Forward flexion To shins    Extension 50% No change   SB right 3\" from knee Stiffness, tightness inR posterior quadrant and buttox   SB left  2\" from knee R buttox pain   Rotation right 75%    Rotation left 75% R buttox pain     OBJECTIVE LS  Posture:  Lateral Shift: [] R    [] L     [] +  [x] -  Kyphosis: [] Increased [] Decreased   []  WNL  Lordosis:  [] Increased [] Decreased   [x] WNL      Gait: WFL    Dural Mobility:  Seated Slump Test NT  Supine SLR NT  UE neural tension NT    Palpation  + jump sign at the Sacrum, R greater trochanter, glute med, ITB, quad    Strength  Hip : L LE all 5/5  R LE: hip flexion 3+/5 p!; abd 3+/5 p!, IR 3+/5 with cramping, ER 3+/5 with pain; extension (knee flexion) 4-/5, ext with knee extended 4-/5    Quad la deg (B) but lacking good quad contraction on the R  HS: 5/5    Core: reduced bridge to 50%    Special Tests  Sacral thrust +  MAKENNA + for posterior hip tightness  Scour (-)  Thigh thrust (-)      S[place]roilliac:  Crests: R high    ASIS: R high    PSIS:       Hip: Rhonda + (B)    Piriformis: + R with pain        Deficits: Rebecca's: + R     Gastrocsoleus  +    SLS L: needs assist but normal pattern  R: trendelenburg and comp trendelenburg, requires UE assist and notes R LE pain to the knee     Pain Level (0-10 scale) post treatment: 1    ASSESSMENT/Changes in Function: see IE    Patient will continue to benefit from skilled PT services to modify and progress therapeutic interventions, address functional mobility deficits, address ROM deficits, address strength deficits, analyze and address soft tissue restrictions, analyze and cue movement patterns, analyze and modify body mechanics/ergonomics, assess and modify postural abnormalities, address imbalance/dizziness and instruct in home and community integration to attain remaining goals.      [x]  See Plan of Care  []  See progress note/recertification  []  See Discharge Summary         Progress towards goals / Updated goals:  SEE IE FOR GOALS     PLAN  []  Upgrade activities as tolerated     []  Continue plan of care  []  Update interventions per flow sheet       []  Discharge due to:_  [x]  Other:Initiate POC as stated in the IE      Justification for Eval Code Complexity:  Patient History : ongoing pain for 2 years  Examination see IE  Clinical Presentation: evolving with changing symptoms  Clinical Decision Making : FOTO 54/100     Mica Grijalva DPT 1/13/2022  9:36 AM    Future Appointments   Date Time Provider Shahriar Moran   1/13/2022 10:45 AM Kulwant Matos, PT [facility]

## 2022-01-21 ENCOUNTER — HOSPITAL ENCOUNTER (OUTPATIENT)
Dept: PHYSICAL THERAPY | Age: 67
Discharge: HOME | End: 2022-01-21
Payer: MEDICARE

## 2022-01-21 PROCEDURE — 97140 MANUAL THERAPY 1/> REGIONS: CPT

## 2022-01-21 PROCEDURE — 97110 THERAPEUTIC EXERCISES: CPT

## 2022-01-21 NOTE — PROGRESS NOTES
PT DAILY TREATMENT NOTE     Patient Name: Freddie Hernandez  Date:2022  : 1955  [x]  Patient  Verified  Payor: Ryan Blum / Plan: [place] MEDICARE PART A & B / Product Type: Medicare /    In time: 8:53 am       Out time: 9:37 am  Total Treatment Time (min): 44  Total Timed Codes (min): 44  1:1 Treatment Time (min): 44   Visit #: 2 of 8-10    Treatment Area: Other low back pain [M54.59]    SUBJECTIVE  Pain Level (0-10 scale): 1-2  Any medication changes, allergies to medications, adverse drug reactions, diagnosis change, or new procedure performed?: [x] No    [] Yes (see summary sheet for update)  Subjective functional status/changes:   [] No changes reported  Patient notes intent to initiate HEP after today's session.      OBJECTIVE  Modality rationale: PD     Min Type Additional Details    [] Estim: []Att   []Unatt                  []IFC  []Premod                                            []NMES    []Other:  []w/ice   []w/heat  Position:  Location:    []  Traction: [] Cervical       [] Lumbar                       [] Supine        [] Prone                       []Intermittent   []Continuous Lbs:  [] before manual  [] after manual    []  Ultrasound: []Continuous   [] Pulsed                           []1MHz   []3MHz Location:  W/cm2:    []  Iontophoresis with dexamethasone         Location: [] Take home patch   [] In clinic    []  Ice     []  heat  []  Ice massage Position:  Location:    []  Vasopneumatic Device  If using vaso (only need to measure limb vaso being performed on)      pre-treatment girth :       post-treatment girth :       measured at (landmark location)  Pressure: [] lo [] med [] hi   Temp: [] lo [] med [] hi   [] Skin assessment post-treatment:  []intact    []redness- no adverse reaction                                                                                 []redness  adverse reaction:     32 min Therapeutic Exercise:  [x] See flow sheet: initiated per IE, emphasis on HEP review to promote proper mechanics for home use   Rationale: increase strength, improve coordination, improve balance and increase proprioception to improve the patients ability to perform ADLs     12 min Manual Therapy:  massage gun DTM to (R) gluteus and QL   Rationale: decrease pain to improve the patients ability to increase ambulation tolerance. The manual therapy interventions were performed at a separate and distinct time from the therapeutic activities interventions. with TE min Patient Education: [x] Review HEP from IE; encouraged initiation; use of L/S flexion with (L) bias to address possibility of neurogenic claudication component     Other Objective/Functional Measures:       Pain Level (0-10 scale) post treatment: 1    ASSESSMENT/Changes in Function:   Good tolerance to treatment today with patient req 100% verbal/tactile cueing and demo for proper form/technique with all newly introduced therex. Patient will continue to benefit from skilled PT services to modify and progress therapeutic interventions, address functional mobility deficits, address ROM deficits, address strength deficits, analyze and address soft tissue restrictions, analyze and cue movement patterns, analyze and modify body mechanics/ergonomics, assess and modify postural abnormalities and address imbalance/dizziness to attain remaining goals. []  See Plan of Care  []  See progress note/recertification  []  See Discharge Summary         Progress towards goals / Updated goals: · Short Term Goals: To be accomplished in  2  treatments:  1. Pt will be independent and compliant with HEP to decrease pain, increase ROM and return pt to PLOF. Status at last assessment: initiate at IE  · Long Term Goals: To be accomplished in  8-10  treatments:  1. Pt will increase score on the FOTO to > or = 64/100 to demo an increase in functional activity tolerance. Status at last assessment: 54/100  2.  Pt will be able to self-manage ave pain to < or = 2/10 ave  to improve ease of posture, mobility and self-care  Status at last assessment: ranges 1-6/10  3. Pt will have an increase in R hip and quad MMT to > or = 4+/5 all planes to improve mechanics for standing, balance, gait and ADLs. Status at last assessment:  R LE: hip flexion 3+/5 p!; abd 3+/5 p!, IR 3+/5 with cramping, ER 3+/5 with pain; extension (knee flexion) 4-/5, ext with knee extended 4-/5; Quad la deg (B) but lacking good quad contraction on the R; HS: 5/5  4.  Pt will be able to walk for > or = 30 min with no increase in pain to restore PLOF  Status at last assessment: 15 min with pain in the R posterior hip     PLAN  [x]  Upgrade activities as tolerated     [x]  Continue plan of care  []  Update interventions per flow sheet       []  Discharge due to:_  [x]  Other: assess response to treatment    Nory Almonte, VICTOR MANUEL 2022

## 2022-01-26 ENCOUNTER — APPOINTMENT (OUTPATIENT)
Dept: PHYSICAL THERAPY | Age: 67
End: 2022-01-26
Payer: MEDICARE

## 2022-01-28 ENCOUNTER — HOSPITAL ENCOUNTER (OUTPATIENT)
Dept: PHYSICAL THERAPY | Age: 67
Discharge: HOME | End: 2022-01-28
Payer: MEDICARE

## 2022-01-28 PROCEDURE — 97033 APP MDLTY 1+IONTPHRSIS EA 15: CPT

## 2022-01-28 PROCEDURE — 97110 THERAPEUTIC EXERCISES: CPT

## 2022-01-28 PROCEDURE — 97140 MANUAL THERAPY 1/> REGIONS: CPT

## 2022-01-28 NOTE — PROGRESS NOTES
PT DAILY TREATMENT NOTE     Patient Name: Marleny Clements  Date:2022  : 1955  [x]  Patient  Verified  Payor: Estevan Mueller / Plan: [place] MEDICARE PART A & B / Product Type: Medicare /    In time: 9:30 am      Out time: 10:16 am  Total Treatment Time (min): 46  Total Timed Codes (min): 46  1:1 Treatment Time (min): 41  Visit #: 3 of 8-10    Treatment Area: Other low back pain [M54.59]    SUBJECTIVE  Pain Level (0-10 scale): 3  Any medication changes, allergies to medications, adverse drug reactions, diagnosis change, or new procedure performed?: [x] No    [] Yes (see summary sheet for update)  Subjective functional status/changes:   [] No changes reported  Patient notes increased pain after shoveling snow over the last weekend.     OBJECTIVE  Modality rationale: decrease inflammation, decrease pain to improve the patient's ability to perform ADLs     Min Type Additional Details    [] Estim: []Att   []Unatt                  []IFC  []Premod                                            []NMES    []Other:  []w/ice   []w/heat  Position:  Location:    []  Traction: [] Cervical       [] Lumbar                       [] Supine        [] Prone                       []Intermittent   []Continuous Lbs:  [] before manual  [] after manual    []  Ultrasound: []Continuous   [] Pulsed                           []1MHz   []3MHz Location:  W/cm2:   8 [x]  Iontophoresis with dexamethasone         Location: (R) greater trochanter [x] Take home patch   [] In clinic    []  Ice     []  heat  []  Ice massage Position:  Location:    []  Vasopneumatic Device  If using vaso (only need to measure limb vaso being performed on)      pre-treatment girth :       post-treatment girth :       measured at (landmark location)  Pressure: [] lo [] med [] hi   Temp: [] lo [] med [] hi   [x] Skin assessment post-treatment:  [x]intact    []redness- no adverse reaction []redness  adverse reaction:     26 min Therapeutic Exercise:  [x] See flow sheet: added banded forward partial lunge, banded sumo squats, banded squat to target (ie sit to stand)   Rationale: increase strength, improve coordination, improve balance and increase proprioception to improve the patients ability to perform ADLs     12 min Manual Therapy:  massage gun DTM to (R) > (L) gluteus and QL, manual right quadriceps stretch for 1 minute   Rationale: decrease pain to improve the patients ability to increase ambulation tolerance. The manual therapy interventions were performed at a separate and distinct time from the therapeutic activities interventions. with TE min Patient Education: [x] Review HEP - provided GTB (banded partial squats, banded forward lunges) - ionto education     Other Objective/Functional Measures:     Able to initiate iontophoresis with dexamethasone today as POC was signed by MD; discussed definition, purpose, benefits contraindications (patient denies), and risks with patient noting understanding and in agreement to initiate. Pain Level (0-10 scale) post treatment: 0-1    ASSESSMENT/Changes in Function:   Patient demonstrates good tolerance to partial resisted squats requiring min cueing for posterior weight shift to improve eccentric loading of the gluteal musculature. Mild genu valgus posturing alleviated with use of resistance band. Still challenged with SL loading in bridge form, indicating weakness of the glut med and lateral border of glut max. Patient will continue to benefit from skilled PT services to modify and progress therapeutic interventions, address functional mobility deficits, address ROM deficits, address strength deficits, analyze and address soft tissue restrictions, analyze and cue movement patterns, analyze and modify body mechanics/ergonomics, assess and modify postural abnormalities and address imbalance/dizziness to attain remaining goals.      [] See Plan of Care  []  See progress note/recertification  []  See Discharge Summary         Progress towards goals / Updated goals: · Short Term Goals: To be accomplished in  2  treatments:  1. Pt will be independent and compliant with HEP to decrease pain, increase ROM and return pt to PLOF. Status at last assessment: initiate at IE  · Long Term Goals: To be accomplished in  8-10  treatments:  1. Pt will increase score on the FOTO to > or = 64/100 to demo an increase in functional activity tolerance. Status at last assessment: 54/100  2. Pt will be able to self-manage ave pain to < or = 2/10 ave  to improve ease of posture, mobility and self-care  Status at last assessment: ranges 1-6/10  3. Pt will have an increase in R hip and quad MMT to > or = 4+/5 all planes to improve mechanics for standing, balance, gait and ADLs. Status at last assessment:  R LE: hip flexion 3+/5 p!; abd 3+/5 p!, IR 3+/5 with cramping, ER 3+/5 with pain; extension (knee flexion) 4-/5, ext with knee extended 4-/5; Quad la deg (B) but lacking good quad contraction on the R; HS: 5/5  Current: SL bridge kickout at approx 25% pelvic lift (22)  4.  Pt will be able to walk for > or = 30 min with no increase in pain to restore PLOF  Status at last assessment: 15 min with pain in the R posterior hip     PLAN  [x]  Upgrade activities as tolerated     [x]  Continue plan of care  []  Update interventions per flow sheet       []  Discharge due to:_  []  Other:_    Nory Almonte, VICTOR MANUEL 2022

## 2022-02-02 ENCOUNTER — HOSPITAL ENCOUNTER (OUTPATIENT)
Dept: PHYSICAL THERAPY | Age: 67
Discharge: HOME | End: 2022-02-02
Payer: MEDICARE

## 2022-02-02 PROCEDURE — 97033 APP MDLTY 1+IONTPHRSIS EA 15: CPT

## 2022-02-02 PROCEDURE — 97140 MANUAL THERAPY 1/> REGIONS: CPT

## 2022-02-02 PROCEDURE — 97110 THERAPEUTIC EXERCISES: CPT

## 2022-02-02 NOTE — PROGRESS NOTES
PT DAILY TREATMENT NOTE     Patient Name: Freddie Hernandez  Date:2022  : 1955  [x]  Patient  Verified  Payor: Ryan Blum / Plan: [place] / Product Type: Medicare /    In time: 11:30 am      Out time: 12:20 pm  Total Treatment Time (min): 50  Total Timed Codes (min): 50  1:1 Treatment Time (min): 45  Visit #: 4 of 8-10    Treatment Area: Other low back pain [M54.59]    SUBJECTIVE  Pain Level (0-10 scale): 1 in knees  Any medication changes, allergies to medications, adverse drug reactions, diagnosis change, or new procedure performed?: [x] No    [] Yes (see summary sheet for update)  Subjective functional status/changes:   [] No changes reported  Patient notes feeling good today. She feels iontophoresis has been helpful. No adverse reaction reported.      OBJECTIVE  Modality rationale: decrease inflammation, decrease pain to improve the patient's ability to perform ADLs     Min Type Additional Details    [] Estim: []Att   []Unatt                  []IFC  []Premod                                            []NMES    []Other:  []w/ice   []w/heat  Position:  Location:    []  Traction: [] Cervical       [] Lumbar                       [] Supine        [] Prone                       []Intermittent   []Continuous Lbs:  [] before manual  [] after manual    []  Ultrasound: []Continuous   [] Pulsed                           []1MHz   []3MHz Location:  W/cm2:   8 [x]  Iontophoresis with dexamethasone         Location: (R) greater trochanter [x] Take home patch   [] In clinic    []  Ice     []  heat  []  Ice massage Position:  Location:    []  Vasopneumatic Device  If using vaso (only need to measure limb vaso being performed on)      pre-treatment girth :       post-treatment girth :       measured at (landmark location)  Pressure: [] lo [] med [] hi   Temp: [] lo [] med [] hi   [x] Skin assessment post-treatment:  [x]intact    []redness- no adverse reaction []redness  adverse reaction:     30 min Therapeutic Exercise:  [x] See flow sheet: added prone windshield wipers   Rationale: increase strength, improve coordination, improve balance and increase proprioception to improve the patients ability to perform ADLs     12 min Manual Therapy:  TPR and massage gun DTM to (R) > (L) gluteus kristi, massage gun STM to (B) QL   Rationale: decrease pain to improve the patients ability to increase ambulation tolerance. The manual therapy interventions were performed at a separate and distinct time from the therapeutic activities interventions. with TE min Patient Education: [x] Review HEP; prone windshield wipers to be performed after prone quadriceps stretching     Other Objective/Functional Measures:   Prone hip IR/ER: (R) IR 18 deg, ER 32 deg; (L) IR 30 deg, ER 31 deg    Pain Level (0-10 scale) post treatment: 0    ASSESSMENT/Changes in Function:   Patient with reduced (R) hip IR mobility in comparison to the unaffected LE, although responds well to manual interventions to decrease chief c/o pain. Challenged with all forward lunging attempts demonstrating increased quadriceps involvement and decreased eccentric glute loading. Discussed expectation to improve hip IR/ER ROM in the meantime prior to resistance training to improve mobility of the (R) hip. Patient will continue to benefit from skilled PT services to modify and progress therapeutic interventions, address functional mobility deficits, address ROM deficits, address strength deficits, analyze and address soft tissue restrictions, analyze and cue movement patterns, analyze and modify body mechanics/ergonomics, assess and modify postural abnormalities and address imbalance/dizziness to attain remaining goals. []  See Plan of Care  []  See progress note/recertification  []  See Discharge Summary         Progress towards goals / Updated goals: · Short Term Goals:  To be accomplished in  2  treatments:  1. Pt will be independent and compliant with HEP to decrease pain, increase ROM and return pt to PLOF. Status at last assessment: initiate at IE  · Long Term Goals: To be accomplished in  8-10  treatments:  1. Pt will increase score on the FOTO to > or = 64/100 to demo an increase in functional activity tolerance. Status at last assessment: 54/100  2. Pt will be able to self-manage ave pain to < or = 2/10 ave  to improve ease of posture, mobility and self-care  Status at last assessment: ranges 1-6/10  3. Pt will have an increase in R hip and quad MMT to > or = 4+/5 all planes to improve mechanics for standing, balance, gait and ADLs. Status at last assessment:  R LE: hip flexion 3+/5 p!; abd 3+/5 p!, IR 3+/5 with cramping, ER 3+/5 with pain; extension (knee flexion) 4-/5, ext with knee extended 4-/5; Quad la deg (B) but lacking good quad contraction on the R; HS: 5/5  Current: goal progressing; (R) IR 18 deg, ER 32 deg; (L) IR 30 deg, ER 31 deg hip AROM in prone (22)  4.  Pt will be able to walk for > or = 30 min with no increase in pain to restore PLOF  Status at last assessment: 15 min with pain in the R posterior hip     PLAN  [x]  Upgrade activities as tolerated     [x]  Continue plan of care  []  Update interventions per flow sheet       []  Discharge due to:_  []  Other:_    Mayur Rodríguez, PTA 2022

## 2022-02-04 ENCOUNTER — HOSPITAL ENCOUNTER (OUTPATIENT)
Dept: PHYSICAL THERAPY | Age: 67
Discharge: HOME | End: 2022-02-04
Payer: MEDICARE

## 2022-02-04 PROCEDURE — 97110 THERAPEUTIC EXERCISES: CPT

## 2022-02-04 PROCEDURE — 97033 APP MDLTY 1+IONTPHRSIS EA 15: CPT

## 2022-02-04 PROCEDURE — 97140 MANUAL THERAPY 1/> REGIONS: CPT

## 2022-02-04 NOTE — PROGRESS NOTES
PT DAILY TREATMENT NOTE     Patient Name: Kit Hester  Date:2022  : 1955  [x]  Patient  Verified  Payor: Cecilia Trejo / Plan: [place] MEDICARE PART A [place] B / Product Type: Medicare /    In time: 11:02 am       Out time: 11:47 am  Total Treatment Time (min): 45  Total Timed Codes (min): 45  1:1 Treatment Time (min): 40  Visit #: 5 of 8-10    Treatment Area: Other low back pain [M54.59]    SUBJECTIVE  Pain Level (0-10 scale): 1-2  Any medication changes, allergies to medications, adverse drug reactions, diagnosis change, or new procedure performed?: [x] No    [] Yes (see summary sheet for update)  Subjective functional status/changes:   [] No changes reported  Patient notes most difficulty with activities requiring full squatting and prefers to sit on a low stool or bench when doing yardwork, gardening, or pulling out weeds.     OBJECTIVE  Modality rationale: decrease inflammation, decrease pain to improve the patient's ability to perform ADLs     Min Type Additional Details    [] Estim: []Att   []Unatt                  []IFC  []Premod                                            []NMES    []Other:  []w/ice   []w/heat  Position:  Location:    []  Traction: [] Cervical       [] Lumbar                       [] Supine        [] Prone                       []Intermittent   []Continuous Lbs:  [] before manual  [] after manual    []  Ultrasound: []Continuous   [] Pulsed                           []1MHz   []3MHz Location:  W/cm2:   8 [x]  Iontophoresis with dexamethasone         Location: (R) greater trochanter [x] Take home patch   [] In clinic    []  Ice     []  heat  []  Ice massage Position:  Location:    []  Vasopneumatic Device  If using vaso (only need to measure limb vaso being performed on)      pre-treatment girth :       post-treatment girth :       measured at (landmark location)  Pressure: [] lo [] med [] hi   Temp: [] lo [] med [] hi   [x] Skin assessment post-treatment:  [x]intact    []redness- no adverse reaction                                                                                 []redness  adverse reaction:     26 min Therapeutic Exercise:  [x] See flow sheet: added prone heel press    Rationale: increase strength, improve coordination, improve balance and increase proprioception to improve the patients ability to perform ADLs     11 min Manual Therapy:  TPR and massage gun DTM to (R) gluteus kristi, massage gun STM to (B) QL, manual right hip flexor stretch at EOB   Rationale: decrease pain to improve the patients ability to increase ambulation tolerance. The manual therapy interventions were performed at a separate and distinct time from the therapeutic activities interventions. with TE min Patient Education: [x] Review HEP     Other Objective/Functional Measures:   Prone hip IR/ER: (R) IR 20, deg, ER 50 deg    Pain Level (0-10 scale) post treatment: 0    ASSESSMENT/Changes in Function:   Patient demonstrating increasing hip ER > IR from previous session, although with slow progress towards IR likely due to gluteus minimus strength deficits. (+) DENISHA to approx 95 deg bilaterally. Patient will continue to benefit from skilled PT services to modify and progress therapeutic interventions, address functional mobility deficits, address ROM deficits, address strength deficits, analyze and address soft tissue restrictions, analyze and cue movement patterns, analyze and modify body mechanics/ergonomics, assess and modify postural abnormalities and address imbalance/dizziness to attain remaining goals. []  See Plan of Care  []  See progress note/recertification  []  See Discharge Summary         Progress towards goals / Updated goals: · Short Term Goals: To be accomplished in  2  treatments:  1. Pt will be independent and compliant with HEP to decrease pain, increase ROM and return pt to PLOF. Status at last assessment: initiate at IE  · Long Term Goals:  To be accomplished in 8-10  treatments:  1. Pt will increase score on the FOTO to > or = 64/100 to demo an increase in functional activity tolerance. Status at last assessment: 54/100  2. Pt will be able to self-manage ave pain to < or = 2/10 ave  to improve ease of posture, mobility and self-care  Status at last assessment: ranges 1-6/10  3. Pt will have an increase in R hip and quad MMT to > or = 4+/5 all planes to improve mechanics for standing, balance, gait and ADLs. Status at last assessment:  R LE: hip flexion 3+/5 p!; abd 3+/5 p!, IR 3+/5 with cramping, ER 3+/5 with pain; extension (knee flexion) 4-/5, ext with knee extended 4-/5; Quad la deg (B) but lacking good quad contraction on the R; HS: 5/5  Current: goal progressing; (R) IR 20 deg, ER 50 deg hip AROM in prone (22)  4.  Pt will be able to walk for > or = 30 min with no increase in pain to restore PLOF  Status at last assessment: 15 min with pain in the R posterior hip     PLAN  [x]  Upgrade activities as tolerated     [x]  Continue plan of care  []  Update interventions per flow sheet       []  Discharge due to:_  [x]  Other: consider wall sits and/or half-kneeling challenges to improve loading of the glute to improve patient's ability to complete yardwork    Edmond Ron, PTA 2022

## 2022-02-07 ENCOUNTER — HOSPITAL ENCOUNTER (OUTPATIENT)
Dept: PHYSICAL THERAPY | Age: 67
Discharge: HOME | End: 2022-02-07
Payer: MEDICARE

## 2022-02-07 PROCEDURE — 97110 THERAPEUTIC EXERCISES: CPT

## 2022-02-07 PROCEDURE — 97033 APP MDLTY 1+IONTPHRSIS EA 15: CPT

## 2022-02-07 PROCEDURE — 97140 MANUAL THERAPY 1/> REGIONS: CPT

## 2022-02-07 NOTE — PROGRESS NOTES
PT DAILY TREATMENT NOTE     Patient Name: Marcelo Decker  Date:2022  : 1955  [x]  Patient  Verified  Payor: Nirmala Cornejo / Plan: [place] MEDICARE PART A [place] B / Product Type: Medicare /    In time: 10:45 am           Out time: 11:31 am  Total Treatment Time (min): 46  Total Timed Codes (min): 41  1:1 Treatment Time (min): 41  Visit #: 6 of 8-10    Treatment Area: Other low back pain [M54.59]    SUBJECTIVE  Pain Level (0-10 scale): 0  Any medication changes, allergies to medications, adverse drug reactions, diagnosis change, or new procedure performed?: [x] No    [] Yes (see summary sheet for update)  Subjective functional status/changes:   [] No changes reported  Patient did some gardening this weekend cutting komal bushes and felt great. Mainly having soreness on the sides of hips when walking, but occasionally occurring.     OBJECTIVE  Modality rationale: decrease inflammation, decrease pain to improve the patient's ability to perform ADLs     Min Type Additional Details    [] Estim: []Att   []Unatt                  []IFC  []Premod                                            []NMES    []Other:  []w/ice   []w/heat  Position:  Location:    []  Traction: [] Cervical       [] Lumbar                       [] Supine        [] Prone                       []Intermittent   []Continuous Lbs:  [] before manual  [] after manual    []  Ultrasound: []Continuous   [] Pulsed                           []1MHz   []3MHz Location:  W/cm2:   8 [x]  Iontophoresis with dexamethasone         Location: (R) greater trochanter [x] Take home patch   [] In clinic    []  Ice     []  heat  []  Ice massage Position:  Location:    []  Vasopneumatic Device  If using vaso (only need to measure limb vaso being performed on)      pre-treatment girth :       post-treatment girth :       measured at (landmark location)  Pressure: [] lo [] med [] hi   Temp: [] lo [] med [] hi   [x] Skin assessment post-treatment:  [x]intact    []redness- no adverse reaction                                                                                 []redness  adverse reaction:     28 min Therapeutic Exercise:  [x] See flow sheet: added SLS, added 2# ankle weight to prone hip IR/ER   Rationale: increase strength, improve coordination, improve balance and increase proprioception to improve the patients ability to perform ADLs     10 min Manual Therapy:  TPR and massage gun DTM to (R) gluteus kristi, massage gun STM to (B) QL   Rationale: decrease pain to improve the patients ability to increase ambulation tolerance. The manual therapy interventions were performed at a separate and distinct time from the therapeutic activities interventions. with TE min Patient Education: [x] Review HEP     Other Objective/Functional Measures:   Prone hip IR/ER: (R) IR 30 deg    Pain Level (0-10 scale) post treatment: 0    ASSESSMENT/Changes in Function:   Patient with significant increase in (R) hip IR AROM from 20 deg at last treatment to 30 deg today. Able to initiate SLS, although with patient challenged, demonstrating ability to hold the position for only 6-7 seconds before swaying. Patient will continue to benefit from skilled PT services to modify and progress therapeutic interventions, address functional mobility deficits, address ROM deficits, address strength deficits, analyze and address soft tissue restrictions, analyze and cue movement patterns, analyze and modify body mechanics/ergonomics, assess and modify postural abnormalities and address imbalance/dizziness to attain remaining goals. []  See Plan of Care  []  See progress note/recertification  []  See Discharge Summary         Progress towards goals / Updated goals: · Short Term Goals: To be accomplished in  2  treatments:  1. Pt will be independent and compliant with HEP to decrease pain, increase ROM and return pt to PLOF. Status at last assessment: initiate at IE  · Long Term Goals:  To be accomplished in  8-10  treatments:  1. Pt will increase score on the FOTO to > or = 64/100 to demo an increase in functional activity tolerance. Status at last assessment: 54/100  2. Pt will be able to self-manage ave pain to < or = 2/10 ave  to improve ease of posture, mobility and self-care  Status at last assessment: ranges 1-6/10  3. Pt will have an increase in (R) hip and quad MMT to > or = 4+/5 all planes to improve mechanics for standing, balance, gait and ADLs. Status at last assessment:  (R) LE: hip flexion 3+/5 p!; abd 3+/5 p!, IR 3+/5 with cramping, ER 3+/5 with pain; extension (knee flexion) 4-/5, ext with knee extended 4-/5; Quad la deg (B) but lacking good quad contraction on the R; HS: 5/5  Current: goal progressing; (R) IR 30 deg, ER 50 deg hip AROM in prone (22)  4.  Pt will be able to walk for > or = 30 min with no increase in pain to restore PLOF  Status at last assessment: 15 min with pain in the (R) posterior hip     PLAN  [x]  Upgrade activities as tolerated     [x]  Continue plan of care  []  Update interventions per flow sheet       []  Discharge due to:_  [x]  Other: consider wall sits and/or half-kneeling challenges to improve loading of the glute to improve patient's ability to complete yardwork    Kiko Kimbrough, PTA 2022

## 2022-02-09 ENCOUNTER — APPOINTMENT (OUTPATIENT)
Dept: PHYSICAL THERAPY | Age: 67
End: 2022-02-09
Payer: MEDICARE

## 2022-02-11 ENCOUNTER — HOSPITAL ENCOUNTER (OUTPATIENT)
Dept: PHYSICAL THERAPY | Age: 67
Discharge: HOME | End: 2022-02-11
Payer: MEDICARE

## 2022-02-11 PROCEDURE — 97033 APP MDLTY 1+IONTPHRSIS EA 15: CPT

## 2022-02-11 PROCEDURE — 97140 MANUAL THERAPY 1/> REGIONS: CPT

## 2022-02-11 PROCEDURE — 97110 THERAPEUTIC EXERCISES: CPT

## 2022-02-11 NOTE — PROGRESS NOTES
[address] [facility] AT [address] [place], [place]  Phone: [phone] Fax: [phone]  PROGRESS NOTE  Patient Name: Breana Echeverria : 1955   Treatment/Medical Diagnosis: Other low back pain [M54.59]   Referral Source: Ksenia Valenzuela MD     Date of Initial Visit: 22 Attended Visits: 7 Missed Visits: 0     SUMMARY OF TREATMENT  Pt is a 76 yo female with ongoing R glute, intermittent back and buttox pain prior to R THR and then worsening/not resolving after joint replacement. Treatment has consisted of the following: Therapeutic exercise, Therapeutic activities, Neuromuscular re-education, Physical agent/modality, Gait/balance training, Manual therapy, Patient education, Self Care training, Functional mobility training, Home safety training and Stair training; [address] [place] CURRENT STATUS  Patient reports a significant decrease in pain since initiating PT, possibly due to increasing hip AROM and strength as compared to initial evaluation. Patient states she is now able to walk up and down her stairs normally earlier in the day, but fatigues as the day progresses and must revert to a step-to-step pattern by the end of the day.  Assessment as follows:    Improvements: less pain throughout day with ADLs, improved stair negotiation (pt notes ability to ascend/descend normally earlier in the day), recreation (improved comfort when squatting on bench to garden)  Deficits: impaired mechanics when ascending/descending stairs in PM (two feet each step), fatigue with prolonged walking > 20 minutes  FOTO score: 57/100 (at last assessment, 54/100)  (R) hip AROM: flex 96 deg, abd 25 deg, prone ER 60 deg, prone IR 28 deg  (L) hip AROM, for comparison: flex 106 deg, abd 31 deg, prone ER 60 deg, IR 36 deg  (R) hip strength: flex 4/5, abd 4/5 (glut med 3-/5), ext 4/5 (glut max 3/5), ER 4-/5, IR 3+/5  Core strength: 90% bridge  SLS, in high march position to bias glute: (R) 3 seconds, (L) 5 seconds  Pain: (A) 1-2    Goal/Measure of Progress   1. Pt will increase score on the FOTO to > or = 64/100 to demo an increase in functional activity tolerance. Status at last assessment: 54/100  Current: goal progressing; 57/100  2. Pt will be able to self-manage ave pain to < or = 2/10 ave  to improve ease of posture, mobility and self-care  Status at last assessment: ranges 1-6/10  Current: goal progressing; pain ranges 1-2/10 on average (22)  3. Pt will have an increase in (R) hip and quad MMT to > or = 4+/5 all planes to improve mechanics for standing, balance, gait and ADLs. Status at last assessment:  (R) LE: hip flexion 3+/5 p!; abd 3+/5 p!, IR 3+/5 with cramping, ER 3+/5 with pain; extension (knee flexion) 4-/5, ext with knee extended 4-/5; Quad la deg (B) but lacking good quad contraction on the R; HS: 5/5   Current: goal progressing; (R) hip strength: flex 4/5, abd 4/5 (glut med 3-/5), ext 4/5 (glut max 3/5), ER 4-/5, IR 3+/5  4. Pt will be able to walk for > or = 30 min with no increase in pain to restore PLOF  Status at last assessment: 15 min with pain in the (R) posterior hip   Current: goal progressing; patient reports walking for 20 minutes on treadmill prior to onset of pain and fatigue in the posterior hip    New Goals to be achieved in __4-8__  treatments:  Continue per established goals. RECOMMENDATIONS  Recommend cont skilled PT at 2x/weekly for 2-4 weeks to address strength/ROM deficits, achieve stated goals, and progress patient towards PLOF. If you have any questions/comments please contact us directly at [phone]. Thank you for allowing us to assist in the care of your patient.   LPTA Signature: Naida Cortes Falling  Date: 2022   PT Signature: Tammy Cage DPT Time: 3:23 PM   NOTE TO PHYSICIAN:  PLEASE COMPLETE THE ORDERS BELOW AND FAX TO   [facility]: [phone]. If you are unable to process this request in 24 hours please contact our office: [phone].  ___ I have read the above report and request that my patient continue as recommended.   ___ I have read the above report and request that my patient continue therapy with the following changes/special instructions:_________________________________________________________   ___ I have read the above report and request that my patient be discharged from therapy.      Physician Signature:        Date:       Time:   Reporting Period: 1/13/22-2/11/22

## 2022-02-11 NOTE — PROGRESS NOTES
PT DAILY TREATMENT NOTE     Patient Name: Shauna Brown  Date:2022  : 1955  [x]  Patient  Verified  Payor: Stas Hedrick / Plan: [place] / Product Type: Medicare /    In time: 11:00 am           Out time: 11:59 pm  Total Treatment Time (min): 59  Total Timed Codes (min): 59  1:1 Treatment Time (min): 54  Visit #: 7 of 8-10    Treatment Area: Other low back pain [M54.59]    SUBJECTIVE  Pain Level (0-10 scale): 1  Any medication changes, allergies to medications, adverse drug reactions, diagnosis change, or new procedure performed?: [x] No    [] Yes (see summary sheet for update)  Subjective functional status/changes:   [] No changes reported  Patient notes using the elliptical for 35 minutes, followed by treadmill for 20 minutes, and finishing using leg press at 90-95# on one leg. She states she has felt less pain overall throughout the day but still has fatigue and glut pain when walking for 20 minutes.     OBJECTIVE  Modality rationale: decrease inflammation, decrease pain to improve the patient's ability to perform ADLs     Min Type Additional Details    [] Estim: []Att   []Unatt                  []IFC  []Premod                                            []NMES    []Other:  []w/ice   []w/heat  Position:  Location:    []  Traction: [] Cervical       [] Lumbar                       [] Supine        [] Prone                       []Intermittent   []Continuous Lbs:  [] before manual  [] after manual    []  Ultrasound: []Continuous   [] Pulsed                           []1MHz   []3MHz Location:  W/cm2:   8 [x]  Iontophoresis with dexamethasone         Location: (R) TFL [x] Take home patch   [] In clinic    []  Ice     []  heat  []  Ice massage Position:  Location:    []  Vasopneumatic Device  If using vaso (only need to measure limb vaso being performed on)      pre-treatment girth :       post-treatment girth :       measured at (landmark location)  Pressure: [] lo [] med [] hi   Temp: [] lo [] med [] hi   [x] Skin assessment post-treatment:  [x]intact    []redness- no adverse reaction                                                                                 []redness  adverse reaction:     39 min Therapeutic Exercise:  [x] See flow sheet: assisted patient with FOTO completion   Rationale: increase strength, improve coordination, improve balance and increase proprioception to improve the patients ability to perform ADLs     12 min Manual Therapy:  TPR to (R) gluteus medius (near origin), massage gun STM to (B) QL; reassessment; (R) hip distraction with PROM ABD, lateral hip mobs   Rationale: decrease pain to improve the patients ability to increase ambulation tolerance. The manual therapy interventions were performed at a separate and distinct time from the therapeutic activities interventions. with TE min Patient Education: [x] Review HEP - provided YTB for resisted prone hip IR, cueing for cognizant glut set and/or mild hip extension at end-range IR to decrease TFL compensation     Other Objective/Functional Measures:  Improvements: less pain throughout day with ADLs, improved stair negotiation (pt notes ability to ascend/descend normally earlier in the day)  Deficits: impaired mechanics when ascending/descending stairs in PM (two feet each step), fatigue with prolonged walking > 20 minutes  FOTO score: 57/100 (at last assessment, 54/100)  (R) hip AROM: flex 96 deg, abd 25 deg, prone ER 60 deg, prone IR 28 deg  (L) hip AROM, for comparison: flex 106 deg, abd 31 deg, prone ER 60 deg, IR 36 deg  (R) hip strength: flex 4/5, abd 4/5 (glut med 3-/5), ext 4/5 (glut max 3/5), ER 4-/5, IR 3+/5  Core strength: 90% bridge  Pain: (A) 1-2      Pain Level (0-10 scale) post treatment: 0    ASSESSMENT/Changes in Function:   See PN.     Patient will continue to benefit from skilled PT services to modify and progress therapeutic interventions, address functional mobility deficits, address ROM deficits, address strength deficits, analyze and address soft tissue restrictions, analyze and cue movement patterns, analyze and modify body mechanics/ergonomics, assess and modify postural abnormalities and address imbalance/dizziness to attain remaining goals. []  See Plan of Care  [x]  See progress note/recertification ()  []  See Discharge Summary         Progress towards goals / Updated goals:  1. Pt will increase score on the FOTO to > or = 64/100 to demo an increase in functional activity tolerance. Status at last assessment: 54/100  Current: goal progressing; 57/100  2. Pt will be able to self-manage ave pain to < or = 2/10 ave  to improve ease of posture, mobility and self-care  Status at last assessment: ranges 1-6/10  Current: goal progressing; pain ranges 1-2/10 on average (22)  3. Pt will have an increase in (R) hip and quad MMT to > or = 4+/5 all planes to improve mechanics for standing, balance, gait and ADLs. Status at last assessment:  (R) LE: hip flexion 3+/5 p!; abd 3+/5 p!, IR 3+/5 with cramping, ER 3+/5 with pain; extension (knee flexion) 4-/5, ext with knee extended 4-/5; Quad la deg (B) but lacking good quad contraction on the R; HS: 5/5   Current: goal progressing; (R) hip strength: flex 4/5, abd 4/5 (glut med 3-/5), ext 4/5 (glut max 3/5), ER 4-/5, IR 3+/5  4.  Pt will be able to walk for > or = 30 min with no increase in pain to restore PLOF  Status at last assessment: 15 min with pain in the (R) posterior hip   Current: goal progressing; patient reports walking for 20 minutes on treadmill prior to onset of pain and fatigue in the posterior hip    PLAN  [x]  Upgrade activities as tolerated     [x]  Continue plan of care  []  Update interventions per flow sheet       []  Discharge due to:_  [x]  Other: consider wall sits and/or half-kneeling challenges to improve loading of the glute to improve patient's ability to complete yardwork; see PN; cont 1-2x4    Rowdy Elena Keyon Ruiz, PTA 2/11/2022

## 2022-02-16 ENCOUNTER — HOSPITAL ENCOUNTER (OUTPATIENT)
Dept: PHYSICAL THERAPY | Age: 67
Discharge: HOME | End: 2022-02-16
Payer: MEDICARE

## 2022-02-16 PROCEDURE — 97140 MANUAL THERAPY 1/> REGIONS: CPT

## 2022-02-16 PROCEDURE — 97110 THERAPEUTIC EXERCISES: CPT

## 2022-02-16 PROCEDURE — 97033 APP MDLTY 1+IONTPHRSIS EA 15: CPT

## 2022-02-16 NOTE — PROGRESS NOTES
PT DAILY TREATMENT NOTE     Patient Name: Maki Tomas  Date:2022  : 1955  [x]  Patient  Verified  Payor: Morenita Khanna / Plan: [place]IC[place] PART [place] [facility] [place] / Product Type: Medicare /    In time: 11:30 am           Out time: 12:26 pm  Total Treatment Time (min): 56  Total Timed Codes (min): 56  1:1 Treatment Time (min): 56  Visit #: 1 of -8    Treatment Area: Other low back pain [M54.59]    SUBJECTIVE  Pain Level (0-10 scale): 0  Any medication changes, allergies to medications, adverse drug reactions, diagnosis change, or new procedure performed?: [x] No    [] Yes (see summary sheet for update)  Subjective functional status/changes:   [x] No changes reported  Patient notes doing well. She is going to the \"Y\" everyday without trouble. Patient notes also having less back pain upon waking in the morning recently.     OBJECTIVE  Modality rationale: decrease inflammation, decrease pain to improve the patient's ability to perform ADLs     Min Type Additional Details    [] Estim: []Att   []Unatt                  []IFC  []Premod                                            []NMES    []Other:  []w/ice   []w/heat  Position:  Location:    []  Traction: [] Cervical       [] Lumbar                       [] Supine        [] Prone                       []Intermittent   []Continuous Lbs:  [] before manual  [] after manual    []  Ultrasound: []Continuous   [] Pulsed                           []1MHz   []3MHz Location:  W/cm2:   8 [x]  Iontophoresis with dexamethasone         Location: (R) TFL [x] Take home patch   [] In clinic    []  Ice     []  heat  []  Ice massage Position:  Location:    []  Vasopneumatic Device  If using vaso (only need to measure limb vaso being performed on)      pre-treatment girth :       post-treatment girth :       measured at (landmark location)  Pressure: [] lo [] med [] hi   Temp: [] lo [] med [] hi   [x] Skin assessment post-treatment:  [x]intact    []redness- no adverse reaction []redness  adverse reaction:     38 min Therapeutic Exercise:  [x] See flow sheet: added seated adductor longus stretch, stork, BOSU partial lunges   Rationale: increase strength, improve coordination, improve balance and increase proprioception to improve the patients ability to perform ADLs     10 min Manual Therapy:  TPR to (R) gluteus kristi (lateral border); STM to (B) adductor longus and medial hamstrings   Rationale: decrease pain to improve the patients ability to increase ambulation tolerance. The manual therapy interventions were performed at a separate and distinct time from the therapeutic activities interventions. with TE min Patient Education: [x] Review HEP     Other Objective/Functional Measures:    Pain Level (0-10 scale) post treatment: 0    ASSESSMENT/Changes in Function:   Patient with difficulty achieving half-kneeling position sec anteromedial and inferior knee pain. (+) hip adductor stiffness addressed well with passive stretching. Quick to fatigue with SLS on the (L) LE when cued for hip abduction during storks. Patient will continue to benefit from skilled PT services to modify and progress therapeutic interventions, address functional mobility deficits, address ROM deficits, address strength deficits, analyze and address soft tissue restrictions, analyze and cue movement patterns, analyze and modify body mechanics/ergonomics, assess and modify postural abnormalities and address imbalance/dizziness to attain remaining goals. []  See Plan of Care  [x]  See progress note/recertification (6/45/98)  []  See Discharge Summary         Progress towards goals / Updated goals:  1. Pt will increase score on the FOTO to > or = 64/100 to demo an increase in functional activity tolerance. Status at last assessment: 57/100  2.  Pt will be able to self-manage ave pain to < or = 2/10 ave  to improve ease of posture, mobility and self-care  Status at last assessment: ranges 1-2/10 on average  3. Pt will have an increase in (R) hip and quad MMT to > or = 4+/5 all planes to improve mechanics for standing, balance, gait and ADLs. Status at last assessment:  (R) hip strength: flex 4/5, abd 4/5 (glut med 3-/5), ext 4/5 (glut max 3/5), ER 4-/5, IR 3+/5  4.  Pt will be able to walk for > or = 30 min with no increase in pain to restore PLOF  Status at last assessment: 20 minutes on treadmill prior to onset of pain and fatigue in the posterior hip    PLAN  [x]  Upgrade activities as tolerated     [x]  Continue plan of care  []  Update interventions per flow sheet       []  Discharge due to:_  []  Other:_    Georgina Plummer, PTA 2/16/2022

## 2022-02-18 ENCOUNTER — APPOINTMENT (OUTPATIENT)
Dept: PHYSICAL THERAPY | Age: 67
End: 2022-02-18
Payer: MEDICARE

## 2022-02-23 ENCOUNTER — HOSPITAL ENCOUNTER (OUTPATIENT)
Dept: PHYSICAL THERAPY | Age: 67
Discharge: HOME | End: 2022-02-23
Payer: MEDICARE

## 2022-02-23 PROCEDURE — 97140 MANUAL THERAPY 1/> REGIONS: CPT

## 2022-02-23 PROCEDURE — 97110 THERAPEUTIC EXERCISES: CPT

## 2022-02-23 NOTE — PROGRESS NOTES
PT DAILY TREATMENT NOTE     Patient Name: Brianna Oquendo  Date:2022  : 1955  [x]  Patient  Verified  Payor: Trent Bolivar / Plan: [place] MEDICARE PART A [facility] B / Product Type: Medicare /    In time: 11:30 am           Out time:12:21 pm  Total Treatment Time (min): 51  Total Timed Codes (min): 51  1:1 Treatment Time (min): 45  Visit #: 2 of 4-8    Treatment Area: Other low back pain [M54.59]    SUBJECTIVE  Pain Level (0-10 scale): 1-2 in front of both thighs  Any medication changes, allergies to medications, adverse drug reactions, diagnosis change, or new procedure performed?: [] No    [] Yes (see summary sheet for update)  Subjective functional status/changes:   [] No changes reported  Patient notes doing a lot of yardwork and full squatting yesterday causing soreness today.     OBJECTIVE  Modality rationale: Held to trial effect on tapering     Min Type Additional Details    [] Estim: []Att   []Unatt                  []IFC  []Premod                                            []NMES    []Other:  []w/ice   []w/heat  Position:  Location:    []  Traction: [] Cervical       [] Lumbar                       [] Supine        [] Prone                       []Intermittent   []Continuous Lbs:  [] before manual  [] after manual    []  Ultrasound: []Continuous   [] Pulsed                           []1MHz   []3MHz Location:  W/cm2:   NT []  Iontophoresis with dexamethasone         Location: [] Take home patch   [] In clinic    []  Ice     []  heat  []  Ice massage Position:  Location:    []  Vasopneumatic Device  If using vaso (only need to measure limb vaso being performed on)      pre-treatment girth :       post-treatment girth :       measured at (landmark location)  Pressure: [] lo [] med [] hi   Temp: [] lo [] med [] hi   [] Skin assessment post-treatment:  []intact    []redness- no adverse reaction                                                                                 []redness  adverse reaction:     39 min Therapeutic Exercise:  [x] See flow sheet: added seated adductor longus stretch, REGAN irene partial lunges   Rationale: increase strength, improve coordination, improve balance and increase proprioception to improve the patients ability to perform ADLs     12 min Manual Therapy:  Massage gun STM to (B) quadriceps in spine; (-) TTP to (R) glute max or med today   Rationale: decrease pain to improve the patients ability to increase ambulation tolerance. The manual therapy interventions were performed at a separate and distinct time from the therapeutic activities interventions. with TE min Patient Education: [x] Review HEP - added seated hip hinge with emphasis on heel dig to improve glut recruitment     Other Objective/Functional Measures:    Pain Level (0-10 scale) post treatment: 0    ASSESSMENT/Changes in Function:   Patient still challenged with all single-limb loading bilaterally when cued for proper glut involvement. Able to decrease anterior thigh pain with cognizant hip ER and heel dig in sitting, which would improve comfort with positioning during yardwork. Patient will continue to benefit from skilled PT services to modify and progress therapeutic interventions, address functional mobility deficits, address ROM deficits, address strength deficits, analyze and address soft tissue restrictions, analyze and cue movement patterns, analyze and modify body mechanics/ergonomics, assess and modify postural abnormalities and address imbalance/dizziness to attain remaining goals. []  See Plan of Care  [x]  See progress note/recertification (6/29/62)  []  See Discharge Summary         Progress towards goals / Updated goals:  1. Pt will increase score on the FOTO to > or = 64/100 to demo an increase in functional activity tolerance. Status at last assessment: 57/100  2.  Pt will be able to self-manage ave pain to < or = 2/10 ave  to improve ease of posture, mobility and self-care  Status at last assessment: ranges 1-2/10 on average  3. Pt will have an increase in (R) hip and quad MMT to > or = 4+/5 all planes to improve mechanics for standing, balance, gait and ADLs. Status at last assessment:  (R) hip strength: flex 4/5, abd 4/5 (glut med 3-/5), ext 4/5 (glut max 3/5), ER 4-/5, IR 3+/5  4.  Pt will be able to walk for > or = 30 min with no increase in pain to restore PLOF  Status at last assessment: 20 minutes on treadmill prior to onset of pain and fatigue in the posterior hip    PLAN  [x]  Upgrade activities as tolerated     [x]  Continue plan of care  []  Update interventions per flow sheet       []  Discharge due to:_  [x]  Other: assess goals NV for D/C; pt notes readiness for D/C upon next session    Joe Etienne, PTA 2/23/2022

## 2022-02-25 ENCOUNTER — HOSPITAL ENCOUNTER (OUTPATIENT)
Dept: PHYSICAL THERAPY | Age: 67
Discharge: HOME | End: 2022-02-25
Payer: MEDICARE

## 2022-02-25 PROCEDURE — 97110 THERAPEUTIC EXERCISES: CPT

## 2022-02-25 PROCEDURE — 97140 MANUAL THERAPY 1/> REGIONS: CPT

## 2022-02-25 NOTE — PROGRESS NOTES
PT DAILY TREATMENT NOTE 8-    Patient Name: Breana Echeverria  Date:2022  : 1955  [x]  Patient  Verified  Payor: Edgar Haddad / Plan: [facility] MEDICARE PART A [facility] B / Product Type: Medicare /    In time: 11:00 am         Out time: 11:47 pm  Total Treatment Time (min): 47  Total Timed Codes (min): 47  1:1 Treatment Time (min): 41  Visit #: 3 of 4-8    Treatment Area: Other low back pain [M54.59]    SUBJECTIVE  Pain Level (0-10 scale): 0  Any medication changes, allergies to medications, adverse drug reactions, diagnosis change, or new procedure performed?: [] No    [] Yes (see summary sheet for update)  Subjective functional status/changes:   [] No changes reported  Patient reports readiness for D/C due to minimal pain recently.     OBJECTIVE  Modality rationale: Held to trial effect on tapering     Min Type Additional Details    [] Estim: []Att   []Unatt                  []IFC  []Premod                                            []NMES    []Other:  []w/ice   []w/heat  Position:  Location:    []  Traction: [] Cervical       [] Lumbar                       [] Supine        [] Prone                       []Intermittent   []Continuous Lbs:  [] before manual  [] after manual    []  Ultrasound: []Continuous   [] Pulsed                           []1MHz   []3MHz Location:  W/cm2:   NT []  Iontophoresis with dexamethasone         Location: [] Take home patch   [] In clinic    []  Ice     []  heat  []  Ice massage Position:  Location:    []  Vasopneumatic Device  If using vaso (only need to measure limb vaso being performed on)      pre-treatment girth :       post-treatment girth :       measured at (landmark location)  Pressure: [] lo [] med [] hi   Temp: [] lo [] med [] hi   [] Skin assessment post-treatment:  []intact    []redness- no adverse reaction                                                                                 []redness  adverse reaction:     35 min Therapeutic Exercise:  [x] See flow sheet: reassessment   Rationale: increase strength, improve coordination, improve balance and increase proprioception to improve the patients ability to perform ADLs     12 min Manual Therapy:  Massage gun STM to (B) quadriceps in spine; (-) TTP to (R) glute max or med today   Rationale: decrease pain to improve the patients ability to increase ambulation tolerance. The manual therapy interventions were performed at a separate and distinct time from the therapeutic activities interventions. with TE min Patient Education: [x] Review HEP for DC     Other Objective/Functional Measures:  FOTO score: 61/100 (at last assessment, 57/100)  Improvements: reduced daily pain, transfers, deep squatting as required by gardening  (R) hip AROM: flex 100 deg, abd 30 deg, ER 60 deg (prone), IR 30 deg (prone)  Hip strength: flex 5/5, glut med 4-/5, glut max 3+/5  SLS: (B) 5 seconds  Pain: (A) 0-1, (B) 0, (W) 2      Pain Level (0-10 scale) post treatment: 0    ASSESSMENT/Changes in Function:   See D/C. Patient will continue to benefit from skilled PT services to modify and progress therapeutic interventions, address functional mobility deficits, address ROM deficits, address strength deficits, analyze and address soft tissue restrictions, analyze and cue movement patterns, analyze and modify body mechanics/ergonomics, assess and modify postural abnormalities and address imbalance/dizziness to attain remaining goals. [x]  See Discharge Summary         Progress towards goals / Updated goals:  1. Pt will increase score on the FOTO to > or = 64/100 to demo an increase in functional activity tolerance. Status at last assessment: 57/100  Current: goal progressing; 61/100  2. Pt will be able to self-manage ave pain to < or = 2/10 ave  to improve ease of posture, mobility and self-care  Status at last assessment: ranges 1-2/10 on average  Current: goal met; 0-2/10, 2 at max  3.  Pt will have an increase in (R) hip and quad MMT to > or = 4+/5 all planes to improve mechanics for standing, balance, gait and ADLs. Status at last assessment:  (R) hip strength: flex 4/5, abd 4/5 (glut med 4-/5), ext 4/5 (glut max 3/5), ER 4-/5, IR 3+/5   Current: goal progressing;  flex 5/5, glut med 4-/5, glut max 3+/5  4.  Pt will be able to walk for > or = 30 min with no increase in pain to restore PLOF  Status at last assessment: 20 minutes on treadmill prior to onset of pain and fatigue in the posterior hip   Current: goal progressing; patient notes ability to walk 1 hour with rest breaks    PLAN  [x]  Discharge due to: program complete and patient expressing readiness; discussed how symptom improvement may correlate to increases in strength and mobility over time with patient in agreement    Kristen Sanchez, PTA 2/25/2022

## 2022-02-25 NOTE — PROGRESS NOTES
Physical Therapy Discharge Instructions      [facility] [address]  [phone] fax      Patient: Dorothy Masters  : 1955      Continue Home Exercise Program 1-2 times per day for 6 weeks, then decrease to 4 times per week      Continue with    [x] Ice  as needed     [] Heat           Follow up with MD:     [] Upon completion of therapy     [x] As needed      Recommendations:     [x]   Return to activity with home program    []   Return to activity with the following modifications:       []Post Rehab Program    []Join Independent aquatic program     []Return to/join local gym          [place], [place]  2022

## 2022-09-16 ENCOUNTER — HOSPITAL ENCOUNTER (OUTPATIENT)
Dept: PHYSICAL THERAPY | Age: 67
Discharge: HOME | End: 2022-09-16
Payer: MEDICARE

## 2022-09-16 PROCEDURE — 97161 PT EVAL LOW COMPLEX 20 MIN: CPT

## 2022-09-16 PROCEDURE — 97530 THERAPEUTIC ACTIVITIES: CPT

## 2022-09-16 PROCEDURE — 97110 THERAPEUTIC EXERCISES: CPT

## 2022-09-16 NOTE — PROGRESS NOTES
PT DAILY TREATMENT NOTE     Patient Name: Natalie Hook  Date:2022  : 1955  [x]  Patient  Verified  Payor: Norma Anguiano / Plan: [place] & [place] / Product Type: Medicare /    In time:2:20  Out time:3:15  Total Treatment Time (min): 55  Visit #: 1 of 10    Medicare/BCBS Only   Total Timed Codes (min):  25 1:1 Treatment Time:  55       Treatment Area: Other low back pain [M54.59]    SUBJECTIVE  Pain Level (0-10 scale): 2  Any medication changes, allergies to medications, adverse drug reactions, diagnosis change, or new procedure performed?: [x] No    [] Yes (see summary sheet for update)  Subjective functional status/changes:   [] No changes reported    CC: low back pain  History/Mechanism of Injury: back surgery in July  Current Symptoms/Complaints: synovial cyst on L3, removed  Generalized pain across low back- tight ache  Pain through right hip has improved since synovial cyst surgery  Right hip flexor pain/weakness  Pain-  Current: 2/10     Worst: 5-6/10   Best: 0/10  Aggravated By: prolonged standing, walking  Alleviated By: rest  Previous Treatment/Compliance: previous physical therapy, acupuncture  Mobility Devices: denies AD use  PMHx/Surgical Hx: HTN, right PRAKASH 2019, recent synovial cyst  Work Hx: retired  Living Situation: lives in [facility]  Household Modifications: Hobbies: yardwork  PLOF: functionally independent  Limitations to PLOF: difficulty with prolonged standing/ambulation  Pt Goals: find consistent program    OBJECTIVE    30 min [x]Eval                  []Re-Eval     15 min Therapeutic Exercise:  [] See flow sheet :   Rationale: increase ROM and increase strength to improve the patients ability to perform community ambulation.     10 min Therapeutic Activity:  []  See flow sheet : Patient education on therapy assessment, prognosis, expectations for therapy sessions, patient goals, mechanical low back pain causes, strengthening program to relieve back pain, and HEP. Rationale: to improve the patients ability to adhere to HEP and therapy sessions for increased compliance when working toward therapy goals. With   [] TE   [x] TA   [] neuro   [] other: Patient Education: [x] Review HEP    [] Progressed/Changed HEP based on:   [] positioning   [] body mechanics   [] transfers   [] heat/ice application    [] other:      Other Objective/Functional Measures:     Observation: standing in slightly increased hip flexion  Palpation: TTP at B lumbar paraspinals, right parasacral musculature    Strength:   Right (/5) Left (/5)   Hip     Flexion 4 5             Abduction 4- 4             Adduction               Extension 4- 4             ER 4 4+             IR 5 5   Knee   Extension 5 5              Flexion 5 5   Ankle   Dorsiflexion 5 5       Gait: 2MWT= 475' without AD,     Functional Squat: increased anterior knee translation, large momentum strategy to increase wt shift forward rather than driving from the hips  30\" STS = 13 repetitions    Stair Negotiation: reciprocal    Balance: Romberg 30\" EO/EC    Reflexes/Sensation: B patellar reflex 1+ B    Special Tests:      Right Left   Hamstring 90/90 +   +   Ely + +     Pain Level (0-10 scale) post treatment: 2    ASSESSMENT/Changes in Function: See POC    Patient will continue to benefit from skilled PT services to modify and progress therapeutic interventions, address functional mobility deficits, address ROM deficits, address strength deficits, analyze and address soft tissue restrictions, analyze and cue movement patterns, analyze and modify body mechanics/ergonomics, assess and modify postural abnormalities, address imbalance/dizziness and instruct in home and community integration to attain remaining goals.      [x]  See Plan of Care  []  See progress note/recertification  []  See Discharge Summary         Progress towards goals / Updated goals:  See POC    PLAN  [x]  Upgrade activities as tolerated     []  Continue plan of care  [x]  Update interventions per flow sheet       []  Discharge due to:_  []  Other:_      Dru Wilkerson 9/16/2022  7:50 AM    Future Appointments   Date Time Provider Shahriar Moran   9/16/2022  2:15 PM Bradley Abreu [facility]

## 2022-09-16 NOTE — PROGRESS NOTES
[facility] AT 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     ========================================================================    Assessment / key information:  Pt is a pleasant 79 y.o. female who presents with c/o low back pain and right hip pain. The patient reports a chronic history of low back pain and right hip pain following right hip PRAKASH in 2019. Recently, pt underwent surgery in July to remove synovial cyst at L3 level with pt noting decreased right posterolateral thigh pain since surgery. She continues to report chronic low level low back pain that is exacerbated with prolonged standing/ambulation. Signs/symptoms at eval consistent with mechanical low back pain. Functional deficits include: impaired hip strength, impaired ambulation tolerance, decreased standing tolerance. Rehab potential is fair due to chronicity of condition.  Pt would benefit from skilled PT to address above deficits to improve Pt's function and ability to return to PLOF with decreased pain.      ========================================================================    Eval Complexity: History: HIGH Complexity :3+ comorbidities / personal factors will impact the outcome/ POC Exam:MEDIUM Complexity : 3 Standardized tests and measures addressing body structure, function, activity limitation and / or participation in recreation  Presentation: LOW Complexity : Stable, uncomplicated  Clinical Decision Making:MEDIUM Complexity : FOTO score of 26-74Overall Complexity:LOW   Problem List: pain affecting function, decrease ROM, decrease strength, impaired gait/ balance, decrease ADL/ functional abilitiies, decrease activity tolerance, decrease flexibility/ joint mobility, and decrease transfer abilities   Treatment Plan may include any combination of the following: Therapeutic exercise, Therapeutic activities, Neuromuscular re-education, Physical agent/modality, Gait/balance training, Manual therapy, Aquatic therapy, Patient education, Self Care training, Functional mobility training, Home safety training, and Stair training  Patient / Family readiness to learn indicated by: asking questions    Persons(s) to be included in education: patient (P)  Barriers to Learning/Limitations: None  Measures taken:    Patient Goal (s): \"Less pain, have a good program to use after discharge\"   Patient self reported health status: good  Rehabilitation Potential: fair    GOALS-  Short Term Goals: To be accomplished in 1 week  - Goal: Pt to be compliant with initial HEP to improve ability to independently manage symptoms. Status at last note/certification: Established and reviewed with Pt  Long Term Goals: To be accomplished in 10 treatments  - Goal: Pt to ambulate at least 525' during 2MWT to improve ease with prolonged community ambulation. Status at last note/certification: 986' without AD  - Goal: Pt to perform 10 floor<>waist lifts with 20 lbs resistance without increased pain to increase ease with ADLs.   Status at last note/certification: difficulty with lifting  - Goal: Pt to demonstrate at least 4+/5 B hip extension MMT to increase ease of prolonged standing. Status at last note/certification: right 4-/5, left 4/5  - Goal: Pt to report FOTO score of at least 63 pts to demonstrate improved function and quality of life. Status at last note/certification: FOTO 58 pts       Frequency / Duration:   - Patient would benefit from skilled PT 2 times per week for 24-36 sessions as needed in this certification period. Goals will be assigned and reassessed every 10 visits/ 30 days per Medicare guidelines     Patient / Caregiver education and instruction: activity modification and exercises    Therapist Signature: Yulisa Vance Date: 2/72/2108   Certification Period: 9/16/22-12/14/22 Time: 1:59 PM   ========================================================================  I certify that the above Physical Therapy Services are being furnished while the patient is under my care. I agree with the treatment plan and certify that this therapy is necessary. Physician Signature:        Date:       Time: ___                                            Stefan Christiansen MD.    Please sign and return to [facility] or you may fax the signed copy to [phone]. Thank you.

## 2022-09-19 ENCOUNTER — HOSPITAL ENCOUNTER (OUTPATIENT)
Dept: PHYSICAL THERAPY | Age: 67
Discharge: HOME | End: 2022-09-19
Payer: MEDICARE

## 2022-09-19 PROCEDURE — 97014 ELECTRIC STIMULATION THERAPY: CPT

## 2022-09-19 PROCEDURE — 97530 THERAPEUTIC ACTIVITIES: CPT

## 2022-09-19 PROCEDURE — 97112 NEUROMUSCULAR REEDUCATION: CPT

## 2022-09-19 PROCEDURE — 97110 THERAPEUTIC EXERCISES: CPT

## 2022-09-19 NOTE — PROGRESS NOTES
PT DAILY TREATMENT NOTE     Patient Name: Yong Buckley  Date:2022  : 1955  [x]  Patient  Verified  Payor: Joey Carlton / Plan: [place] MEDICARE PART A & B / Product Type: Medicare /    In time: 12:20 pm            Out time: 1:14 pm  Total Treatment Time (min): 54  Total Timed Codes (min): 44  1:1 Treatment Time (min): 39   Visit #: 2 of 8-10    Treatment Area: Other low back pain [M54.59]    SUBJECTIVE  Pain Level (0-10 scale): 3-4  Any medication changes, allergies to medications, adverse drug reactions, diagnosis change, or new procedure performed?: [x] No    [] Yes (see summary sheet for update)  Subjective functional status/changes:   [] No changes reported  Patient notes her new HEP made her sore in her thighs and hips.     OBJECTIVE  Modality rationale: decrease pain to improve the patients ability to perform ADLs     Min Type Additional Details   10 [x] Estim: []Att   [x]Unatt                  [x]IFC  []Premod                                            []NMES    []Other:  []w/ice   [x]w/heat  Position: reclined  Location: L/S and hip, mainly left sided    []  Traction: [] Cervical       [] Lumbar                       [] Supine        [] Prone                       []Intermittent   []Continuous Lbs:  [] before manual  [] after manual    []  Ultrasound: []Continuous   [] Pulsed                           []1MHz   []3MHz Location:  W/cm2:    []  Iontophoresis with dexamethasone         Location: [] Take home patch   [] In clinic    []  Ice     []  heat  []  Ice massage Position:  Location:    []  Vasopneumatic Device  If using vaso (only need to measure limb vaso being performed on)      pre-treatment girth :       post-treatment girth :       measured at (landmark location)  Pressure: [] lo [] med [] hi   Temp: [] lo [] med [] hi   [x] Skin assessment post-treatment:  [x]intact    []redness- no adverse reaction []redness - adverse reaction:     Vasopnuematic compression justification:  Per bilateral girth measures taken and listed above the edema is considered significant and having an impact on the patient's strength, balance, gait, and transfers. 13 min Therapeutic Exercise:  [x] See flow sheet: initiated per IE   Rationale: increase ROM and increase strength to improve the patients ability to perform ADLs     17 min Therapeutic Activity:  [x]  See flow sheet: initiated per IE   Rationale: increase strength, improve coordination, improve balance, and increase proprioception  to improve the patients ability to improve squatting, bending      14 min Neuromuscular Re-education:  [x]  See flow sheet: initiated per IE   Rationale: improve coordination, improve balance, and increase proprioception  to improve the patients ability to turning, pivoting    0 min Manual Therapy:  NT   Rationale:             with TE min Patient Education: [x] Review HEP from IE     Other Objective/Functional Measures:   STG met. Pain Level (0-10 scale) post treatment: 0    ASSESSMENT/Changes in Function:   Good tolerance to treatment today with patient req 100% verbal/tactile cueing and demo for proper form/technique with all newly introduced therex. Patient will continue to benefit from skilled PT services to modify and progress therapeutic interventions, address functional mobility deficits, address ROM deficits, address strength deficits, analyze and address soft tissue restrictions, analyze and cue movement patterns, analyze and modify body mechanics/ergonomics, assess and modify postural abnormalities, and address imbalance/dizziness to attain remaining goals. []  See Plan of Care  []  See progress note/recertification  []  See Discharge Summary         Progress towards goals / Updated goals -:  Short Term Goals:  To be accomplished in 1 week  - Goal: Pt to be compliant with initial HEP to improve ability to independently manage symptoms. Status at last note/certification: Established and reviewed with Pt  Current: goal met; pt notes compliance (9/19/22)  Long Term Goals: To be accomplished in 10 treatments  - Goal: Pt to ambulate at least 525' during 2MWT to improve ease with prolonged community ambulation. Status at last note/certification: 478' without AD  - Goal: Pt to perform 10 floor<>waist lifts with 20 lbs resistance without increased pain to increase ease with ADLs. Status at last note/certification: difficulty with lifting  - Goal: Pt to demonstrate at least 4+/5 B hip extension MMT to increase ease of prolonged standing. Status at last note/certification: right 4-/5, left 4/5  - Goal: Pt to report FOTO score of at least 63 pts to demonstrate improved function and quality of life.   Status at last note/certification: FOTO 58 pts   (Progress Note due by 10/16/22)    PLAN  [x]  Upgrade activities as tolerated     [x]  Continue plan of care  []  Update interventions per flow sheet       []  Discharge due to:_  [x]  Other: assess response to treatment     Deshaun Sanchez, PTA 9/19/2022

## 2022-09-22 ENCOUNTER — HOSPITAL ENCOUNTER (OUTPATIENT)
Dept: PHYSICAL THERAPY | Age: 67
Discharge: HOME | End: 2022-09-22
Payer: MEDICARE

## 2022-09-22 PROCEDURE — 97530 THERAPEUTIC ACTIVITIES: CPT

## 2022-09-22 PROCEDURE — 97110 THERAPEUTIC EXERCISES: CPT

## 2022-09-22 PROCEDURE — 97140 MANUAL THERAPY 1/> REGIONS: CPT

## 2022-09-22 PROCEDURE — 97014 ELECTRIC STIMULATION THERAPY: CPT

## 2022-09-22 NOTE — PROGRESS NOTES
PT DAILY TREATMENT NOTE     Patient Name: Pratik Vickers  Date:2022  : 1955  [x]  Patient  Verified  Payor: Rock Worrell / Plan: [place] MEDICARE PART A & B / Product Type: Medicare /    In time: 10:00 am            Out time: 11:02 am  Total Treatment Time (min): 62  Total Timed Codes (min): 52  1:1 Treatment Time (min): 45   Visit #: 3 of 8-10    Treatment Area: Other low back pain [M54.59]    SUBJECTIVE  Pain Level (0-10 scale): 3 with headache  Any medication changes, allergies to medications, adverse drug reactions, diagnosis change, or new procedure performed?: [x] No    [] Yes (see summary sheet for update)  Subjective functional status/changes:   [] No changes reported  Patient notes having a headache today which may limit her ability to exercise today.  She notes soreness in her glutes when walking for long distances and questions whether this is normal.    OBJECTIVE  Modality rationale: decrease pain to improve the patients ability to perform ADLs     Min Type Additional Details   10 [x] Estim: []Att   [x]Unatt                  [x]IFC  []Premod                                            []NMES    []Other:  []w/ice   [x]w/heat  Position: reclined  Location: L/S and hip, mainly left sided    []  Traction: [] Cervical       [] Lumbar                       [] Supine        [] Prone                       []Intermittent   []Continuous Lbs:  [] before manual  [] after manual    []  Ultrasound: []Continuous   [] Pulsed                           []1MHz   []3MHz Location:  W/cm2:    []  Iontophoresis with dexamethasone         Location: [] Take home patch   [] In clinic    []  Ice     []  heat  []  Ice massage Position:  Location:    []  Vasopneumatic Device  If using vaso (only need to measure limb vaso being performed on)      pre-treatment girth :       post-treatment girth :       measured at (landmark location)  Pressure: [] lo [] med [] hi   Temp: [] lo [] med [] hi   [x] Skin assessment post-treatment:  [x]intact    []redness- no adverse reaction                                                                                 []redness - adverse reaction:     16 min Therapeutic Exercise:  [x] See flow sheet: modified certain treatment due to headache   Rationale: increase ROM and increase strength to improve the patients ability to perform ADLs     17 min Therapeutic Activity:  [x]  See flow sheet:    Rationale: increase strength, improve coordination, improve balance, and increase proprioception  to improve the patients ability to improve squatting, bending      10 min Neuromuscular Re-education:  [x]  See flow sheet: added RDLs   Rationale: improve coordination, improve balance, and increase proprioception  to improve the patients ability to turning, pivoting    9 min Manual Therapy:  massage gun STM to (B) glutes   Rationale: decrease pain to improve patient's ability to perform ADLs            with TE min Patient Education: [x] Review HEP; glute soreness is normal, indicating proper use of hip extension to normalize gait; patient may stop and stretch as the soreness progresses     Other Objective/Functional Measures:       Pain Level (0-10 scale) post treatment: 0    ASSESSMENT/Changes in Function:   Patient notes first follow-up treatment did not cause any adverse reaction. Noted difficulty maintaining balance with RDLs req cueing for cognizant hip ER on the stance LE to improve stability. Patient will continue to benefit from skilled PT services to modify and progress therapeutic interventions, address functional mobility deficits, address ROM deficits, address strength deficits, analyze and address soft tissue restrictions, analyze and cue movement patterns, analyze and modify body mechanics/ergonomics, assess and modify postural abnormalities, and address imbalance/dizziness to attain remaining goals.      []  See Plan of Care  []  See progress note/recertification  []  See Discharge Summary         Progress towards goals / Updated goals -:  Short Term Goals: To be accomplished in 1 week  - Goal: Pt to be compliant with initial HEP to improve ability to independently manage symptoms. Status at last note/certification: Established and reviewed with Pt  Current: goal met; pt notes compliance (9/19/22)  Long Term Goals: To be accomplished in 10 treatments  - Goal: Pt to ambulate at least 525' during 2MWT to improve ease with prolonged community ambulation. Status at last note/certification: 811' without AD  - Goal: Pt to perform 10 floor<>waist lifts with 20 lbs resistance without increased pain to increase ease with ADLs. Status at last note/certification: difficulty with lifting  Current: improved tolerance to 10# KB deadlifts (9/22/22)  - Goal: Pt to demonstrate at least 4+/5 B hip extension MMT to increase ease of prolonged standing. Status at last note/certification: right 4-/5, left 4/5  - Goal: Pt to report FOTO score of at least 63 pts to demonstrate improved function and quality of life.   Status at last note/certification: FOTO 58 pts   (Progress Note due by 10/16/22)    PLAN  [x]  Upgrade activities as tolerated     [x]  Continue plan of care  []  Update interventions per flow sheet       []  Discharge due to:_  [x]  Other: assess response to treatment     April Myers, PTA 9/22/2022

## 2022-09-26 ENCOUNTER — HOSPITAL ENCOUNTER (OUTPATIENT)
Dept: PHYSICAL THERAPY | Age: 67
Discharge: HOME | End: 2022-09-26
Payer: MEDICARE

## 2022-09-26 PROCEDURE — 97530 THERAPEUTIC ACTIVITIES: CPT

## 2022-09-26 PROCEDURE — 97110 THERAPEUTIC EXERCISES: CPT

## 2022-09-26 PROCEDURE — 97014 ELECTRIC STIMULATION THERAPY: CPT

## 2022-09-26 PROCEDURE — 97140 MANUAL THERAPY 1/> REGIONS: CPT

## 2022-09-26 NOTE — PROGRESS NOTES
PT DAILY TREATMENT NOTE     Patient Name: Shivani Benson  Date:2022  : 1955  [x]  Patient  Verified  Payor: Jocelyn Barney / Plan: [place] / Product Type: Medicare /    In time: 12:16 pm           Out time: 1:20 pm  Total Treatment Time (min): 64  Total Timed Codes (min): 54  1:1 Treatment Time (min): 39  Visit #: 4 of 8-10    Treatment Area: Other low back pain [M54.59]    SUBJECTIVE  Pain Level (0-10 scale): 0  Any medication changes, allergies to medications, adverse drug reactions, diagnosis change, or new procedure performed?: [x] No    [] Yes (see summary sheet for update)  Subjective functional status/changes:   [] No changes reported  Patient notes ability to mow her yard with minimal symptoms, although is unable to edge the sides if her yard without pain.     OBJECTIVE  Modality rationale: decrease pain to improve the patients ability to perform ADLs     Min Type Additional Details   10 [x] Estim: []Att   [x]Unatt                  [x]IFC  []Premod                                            []NMES    []Other:  []w/ice   [x]w/heat  Position: reclined  Location: L/S and hip, mainly left sided    []  Traction: [] Cervical       [] Lumbar                       [] Supine        [] Prone                       []Intermittent   []Continuous Lbs:  [] before manual  [] after manual    []  Ultrasound: []Continuous   [] Pulsed                           []1MHz   []3MHz Location:  W/cm2:    []  Iontophoresis with dexamethasone         Location: [] Take home patch   [] In clinic    []  Ice     []  heat  []  Ice massage Position:  Location:    []  Vasopneumatic Device  If using vaso (only need to measure limb vaso being performed on)      pre-treatment girth :       post-treatment girth :       measured at (landmark location)  Pressure: [] lo [] med [] hi   Temp: [] lo [] med [] hi   [x] Skin assessment post-treatment:  [x]intact    []redness- no adverse reaction []redness - adverse reaction:     19 min Therapeutic Exercise:  [x] See flow sheet:    Rationale: increase ROM and increase strength to improve the patients ability to perform ADLs     17 min Therapeutic Activity:  [x]  See flow sheet:    Rationale: increase strength, improve coordination, improve balance, and increase proprioception  to improve the patients ability to improve squatting, bending      10 min Neuromuscular Re-education:  [x]  See flow sheet: added RDLs   Rationale: improve coordination, improve balance, and increase proprioception  to improve the patients ability to turning, pivoting    8 min Manual Therapy:  massage gun STM to (B) QL and superior glutes   Rationale: decrease pain to improve patient's ability to perform ADLs            with TE min Patient Education: [x] Review HEP     Other Objective/Functional Measures:       Pain Level (0-10 scale) post treatment: 0    ASSESSMENT/Changes in Function:   Patient demonstrates improved squat form until approximately 80 degree knee flexion depth. Most challenged with RDLs with observable non-painful quadriceps fasciculations. Patient will continue to benefit from skilled PT services to modify and progress therapeutic interventions, address functional mobility deficits, address ROM deficits, address strength deficits, analyze and address soft tissue restrictions, analyze and cue movement patterns, analyze and modify body mechanics/ergonomics, assess and modify postural abnormalities, and address imbalance/dizziness to attain remaining goals. []  See Plan of Care  []  See progress note/recertification  []  See Discharge Summary         Progress towards goals / Updated goals -:  Short Term Goals: To be accomplished in 1 week  - Goal: Pt to be compliant with initial HEP to improve ability to independently manage symptoms.   Status at last note/certification: Established and reviewed with Pt  Current: goal met; pt notes compliance (9/19/22)  Long Term Goals: To be accomplished in 10 treatments  - Goal: Pt to ambulate at least 525' during 2MWT to improve ease with prolonged community ambulation. Status at last note/certification: 674' without AD  - Goal: Pt to perform 10 floor<>waist lifts with 20 lbs resistance without increased pain to increase ease with ADLs. Status at last note/certification: difficulty with lifting  Current: improved tolerance to 10# KB deadlifts (9/22/22) - no difficulty with squat PREs today (9/26/22)  - Goal: Pt to demonstrate at least 4+/5 B hip extension MMT to increase ease of prolonged standing. Status at last note/certification: right 4-/5, left 4/5  - Goal: Pt to report FOTO score of at least 63 pts to demonstrate improved function and quality of life.   Status at last note/certification: FOTO 58 pts   (Progress Note due by 10/16/22)    PLAN  [x]  Upgrade activities as tolerated     [x]  Continue plan of care  []  Update interventions per flow sheet       []  Discharge due to:_  [x]  Other: assess response to treatment     Kristen Allen, PTA 9/26/2022

## 2022-09-29 ENCOUNTER — HOSPITAL ENCOUNTER (OUTPATIENT)
Dept: PHYSICAL THERAPY | Age: 67
Discharge: HOME | End: 2022-09-29
Payer: MEDICARE

## 2022-09-29 PROCEDURE — 97530 THERAPEUTIC ACTIVITIES: CPT

## 2022-09-29 PROCEDURE — 97110 THERAPEUTIC EXERCISES: CPT

## 2022-09-29 PROCEDURE — 97140 MANUAL THERAPY 1/> REGIONS: CPT

## 2022-09-29 PROCEDURE — 97112 NEUROMUSCULAR REEDUCATION: CPT

## 2022-09-29 NOTE — PROGRESS NOTES
PT DAILY TREATMENT NOTE     Patient Name: Holly Daniel  Date:2022  : 1955  [x]  Patient  Verified  Payor: Nabil Webb / Plan: [place] / Product Type: Medicare /    In time: 10:47 am           Out time: 11:41 am  Total Treatment Time (min): 54  Total Timed Codes (min): 54  1:1 Treatment Time (min): 54  Visit #: 5 of 8-10    Treatment Area: Other low back pain [M54.59]    SUBJECTIVE  Pain Level (0-10 scale): 0  Any medication changes, allergies to medications, adverse drug reactions, diagnosis change, or new procedure performed?: [x] No    [] Yes (see summary sheet for update)  Subjective functional status/changes:   [] No changes reported  Patient notes overall fatigue from taking her neighbor to the ER yesterday. She reports being there for six hours.     OBJECTIVE  Modality rationale: decrease pain to improve the patients ability to perform ADLs     Min Type Additional Details   PD [] Estim: []Att   []Unatt                  []IFC  []Premod                                            []NMES    []Other:  []w/ice   []w/heat  Position:   Location:     []  Traction: [] Cervical       [] Lumbar                       [] Supine        [] Prone                       []Intermittent   []Continuous Lbs:  [] before manual  [] after manual    []  Ultrasound: []Continuous   [] Pulsed                           []1MHz   []3MHz Location:  W/cm2:    []  Iontophoresis with dexamethasone         Location: [] Take home patch   [] In clinic    []  Ice     []  heat  []  Ice massage Position:  Location:    []  Vasopneumatic Device  If using vaso (only need to measure limb vaso being performed on)      pre-treatment girth :       post-treatment girth :       measured at (landmark location)  Pressure: [] lo [] med [] hi   Temp: [] lo [] med [] hi   [] Skin assessment post-treatment:  []intact    []redness- no adverse reaction []redness - adverse reaction:     18 min Therapeutic Exercise:  [x] See flow sheet:    Rationale: increase ROM and increase strength to improve the patients ability to perform ADLs. 13 min Therapeutic Activity:  [x]  See flow sheet:    Rationale: increase strength, improve coordination, improve balance, and increase proprioception  to improve the patients ability to improve squatting, bending. 14 min Neuromuscular Re-education:  [x]  See flow sheet: added prone hip IR AROM with emphasis on full progressive AROM   Rationale: improve coordination, improve balance, and increase proprioception  to improve the patients ability to turning, pivoting. 10 min Manual Therapy:  massage gun STM to (B) glute max   Rationale: decrease pain to improve patient's ability to perform ADLs          with TE min Patient Education: [x] Review HEP     Other Objective/Functional Measures:   Hip IR AROM: seated, (R) ~10 deg, (L) ~15-20 deg    Pain Level (0-10 scale) post treatment: 0    ASSESSMENT/Changes in Function:   Patient with limited hip IR for the right greater than the left hip. Responds well to active-assisted, gravity-assisted stretching in the prone position to improve mobility. Gait remains with Trendelenburg patterning (R)>(L), indicating possible inclusion of rebeca training NV to improve and normalize. Patient will continue to benefit from skilled PT services to modify and progress therapeutic interventions, address functional mobility deficits, address ROM deficits, address strength deficits, analyze and address soft tissue restrictions, analyze and cue movement patterns, analyze and modify body mechanics/ergonomics, assess and modify postural abnormalities, and address imbalance/dizziness to attain remaining goals. []  See Plan of Care  []  See progress note/recertification  []  See Discharge Summary         Progress towards goals / Updated goals:  Short Term Goals:  To be accomplished in 1 week  - Goal: Pt to be compliant with initial HEP to improve ability to independently manage symptoms. Status at last note/certification: Established and reviewed with Pt  Current: goal met; pt notes compliance (9/19/22)  Long Term Goals: To be accomplished in 10 treatments  - Goal: Pt to ambulate at least 525' during 2MWT to improve ease with prolonged community ambulation. Status at last note/certification: 001' without AD  Current: good tolerance to TM walking (9/29/22)  - Goal: Pt to perform 10 floor<>waist lifts with 20 lbs resistance without increased pain to increase ease with ADLs. Status at last note/certification: difficulty with lifting  Current: improved tolerance to 10# KB deadlifts (9/22/22) - no difficulty with squat PREs today (9/26/22)  - Goal: Pt to demonstrate at least 4+/5 B hip extension MMT to increase ease of prolonged standing. Status at last note/certification: right 4-/5, left 4/5  - Goal: Pt to report FOTO score of at least 63 pts to demonstrate improved function and quality of life.   Status at last note/certification: FOTO 58 pts   (Progress Note due by 10/16/22)    PLAN  [x]  Upgrade activities as tolerated     [x]  Continue plan of care  []  Update interventions per flow sheet       []  Discharge due to:_  [x]  Other: initiate rebeca training GAURI Guy, PTA 9/29/2022

## 2022-10-03 ENCOUNTER — APPOINTMENT (OUTPATIENT)
Dept: PHYSICAL THERAPY | Age: 67
End: 2022-10-03
Payer: MEDICARE

## 2022-10-06 ENCOUNTER — APPOINTMENT (OUTPATIENT)
Dept: PHYSICAL THERAPY | Age: 67
End: 2022-10-06
Payer: MEDICARE

## 2022-10-10 ENCOUNTER — HOSPITAL ENCOUNTER (OUTPATIENT)
Dept: PHYSICAL THERAPY | Age: 67
Discharge: HOME | End: 2022-10-10
Payer: MEDICARE

## 2022-10-10 PROCEDURE — 97112 NEUROMUSCULAR REEDUCATION: CPT

## 2022-10-10 PROCEDURE — 97530 THERAPEUTIC ACTIVITIES: CPT

## 2022-10-10 PROCEDURE — 97140 MANUAL THERAPY 1/> REGIONS: CPT

## 2022-10-10 PROCEDURE — 97110 THERAPEUTIC EXERCISES: CPT

## 2022-10-10 NOTE — PROGRESS NOTES
PT DAILY TREATMENT NOTE     Patient Name: Tano Mcfadden  Date:10/10/2022  : 1955  [x]  Patient  Verified  Payor: Danielle Tabatha / Plan: [place] PART A [employer] [place] / Product Type: Medicare /    In time: 12:15 pm          Out time: 1:19 pm  Total Treatment Time (min): 64  Total Timed Codes (min): 54  1:1 Treatment Time (min): 53  Visit #: 6 of 8-10    Treatment Area: Other low back pain [M54.59]    SUBJECTIVE  Pain Level (0-10 scale): 1  Any medication changes, allergies to medications, adverse drug reactions, diagnosis change, or new procedure performed?: [x] No    [] Yes (see summary sheet for update)  Subjective functional status/changes:   [] No changes reported  Patient notes going on a walk for 20 minutes with only stiffness. She notes follow-up with her surgeon which went well who recommended follow-up PRN. OBJECTIVE  Modality rationale: decrease pain to improve the patients ability to perform ADLs     Min Type Additional Details   10 [x] Estim: []Att   [x]Unatt                  [x]IFC  []Premod                                            []NMES    []Other:  []w/ice   [x]w/heat  Position: reclined  Location: L/S    []  Traction: [] Cervical       [] Lumbar                       [] Supine        [] Prone                       []Intermittent   []Continuous Lbs:  [] before manual  [] after manual    []  Ice     []  Heat Position:  Location:   [x] Skin assessment post-treatment:  [x]intact    []redness- no adverse reaction                                                                                 []redness - adverse reaction:     16 min Therapeutic Exercise:  [x] See flow sheet:    Rationale: increase ROM and increase strength to improve the patients ability to perform ADLs. 16 min Therapeutic Activity:  [x]  See flow sheet:    Rationale: increase strength, improve coordination, improve balance, and increase proprioception  to improve the patients ability to improve squatting, bending.       15 min Neuromuscular Re-education:  [x]  See flow sheet: added prone donkey kick   Rationale: improve coordination, improve balance, and increase proprioception  to improve the patients ability to turning, pivoting. 8 min Manual Therapy:  massage gun STM to (B) glute max   Rationale: decrease pain to improve patient's ability to perform ADLs          with TE min Patient Education: [x] Review HEP     Other Objective/Functional Measures:       Pain Level (0-10 scale) post treatment: 0    ASSESSMENT/Changes in Function:   Patient with increased stride length, likely due to improved right hip IR ROM (approx 25 deg in the prone position). Most challenged with glute recruitment for hip extension in an opened kinetic chain. Patient will continue to benefit from skilled PT services to modify and progress therapeutic interventions, address functional mobility deficits, address ROM deficits, address strength deficits, analyze and address soft tissue restrictions, analyze and cue movement patterns, analyze and modify body mechanics/ergonomics, assess and modify postural abnormalities, and address imbalance/dizziness to attain remaining goals. []  See Plan of Care  []  See progress note/recertification  []  See Discharge Summary         Progress towards goals / Updated goals:  Short Term Goals: To be accomplished in 1 week  - Goal: Pt to be compliant with initial HEP to improve ability to independently manage symptoms. Status at last note/certification: Established and reviewed with Pt  Current: goal met; pt notes compliance (9/19/22)  Long Term Goals: To be accomplished in 10 treatments  - Goal: Pt to ambulate at least 525' during 2MWT to improve ease with prolonged community ambulation. Status at last note/certification: 242' without AD  Current: good tolerance to TM walking (9/29/22)  - Goal: Pt to perform 10 floor<>waist lifts with 20 lbs resistance without increased pain to increase ease with ADLs.   Status at last note/certification: difficulty with lifting  Current: improved tolerance to 10# KB deadlifts (9/22/22) - no difficulty with squat PREs today (9/26/22)  - Goal: Pt to demonstrate at least 4+/5 B hip extension MMT to increase ease of prolonged standing. Status at last note/certification: right 4-/5, left 4/5  Current: initiate donkey kicks to progress to goal (10/10/22)  - Goal: Pt to report FOTO score of at least 63 pts to demonstrate improved function and quality of life.   Status at last note/certification: FOTO 58 pts   (Progress Note due by 10/16/22)    PLAN  [x]  Upgrade activities as tolerated     [x]  Continue plan of care  []  Update interventions per flow sheet       []  Discharge due to:_  []  Other:_    Blessing Hamilotn, PTA 10/10/2022

## 2022-10-13 ENCOUNTER — HOSPITAL ENCOUNTER (OUTPATIENT)
Dept: PHYSICAL THERAPY | Age: 67
Discharge: HOME | End: 2022-10-13
Payer: MEDICARE

## 2022-10-13 PROCEDURE — 97140 MANUAL THERAPY 1/> REGIONS: CPT

## 2022-10-13 PROCEDURE — 97112 NEUROMUSCULAR REEDUCATION: CPT

## 2022-10-13 PROCEDURE — 97530 THERAPEUTIC ACTIVITIES: CPT

## 2022-10-13 PROCEDURE — 97014 ELECTRIC STIMULATION THERAPY: CPT

## 2022-10-13 NOTE — PROGRESS NOTES
PT DAILY TREATMENT NOTE     Patient Name: Dinorah Thomas  Date:10/13/2022  : 1955  [x]  Patient  Verified  Payor: Guille Carr / Plan: [place] PART [place] [facility] [place] / Product Type: Medicare /    In time: 10:01 am          Out time: 11:09 am  Total Treatment Time (min): 68  Total Timed Codes (min): 58  1:1 Treatment Time (min): 44  Visit #: 7 of 8-10    Treatment Area: Other low back pain [M54.59]    SUBJECTIVE  Pain Level (0-10 scale): 2-3  Any medication changes, allergies to medications, adverse drug reactions, diagnosis change, or new procedure performed?: [x] No    [] Yes (see summary sheet for update)  Subjective functional status/changes:   [] No changes reported  Patient states she painted her slanted closet for multiple hours recently causing increased low back and hip pain. OBJECTIVE  Modality rationale: decrease pain to improve the patients ability to perform ADLs     Min Type Additional Details   10 [x] Estim: []Att   [x]Unatt                  [x]IFC  []Premod                                            []NMES    []Other:  []w/ice   [x]w/heat  Position: prone (pt preference)  Location: L/S    []  Traction: [] Cervical       [] Lumbar                       [] Supine        [] Prone                       []Intermittent   []Continuous Lbs:  [] before manual  [] after manual    []  Ice     []  Heat Position:  Location:   [x] Skin assessment post-treatment:  [x]intact    []redness- no adverse reaction                                                                                 []redness - adverse reaction:     18 min Therapeutic Exercise:  [x] See flow sheet: added SB rollup (modified SB assisted crunch)   Rationale: increase ROM and increase strength to improve the patients ability to perform ADLs.      15 min Therapeutic Activity:  [x]  See flow sheet:    Rationale: increase strength, improve coordination, improve balance, and increase proprioception  to improve the patients ability to improve squatting, bending. 17 min Neuromuscular Re-education:  [x]  See flow sheet:    Rationale: improve coordination, improve balance, and increase proprioception  to improve the patients ability to turning, pivoting. 8 min Manual Therapy:  massage gun STM to (B) glute max   Rationale: decrease pain to improve patient's ability to perform ADLs          with TE min Patient Education: [x] Review HEP     Other Objective/Functional Measures:       Pain Level (0-10 scale) post treatment: 0    ASSESSMENT/Changes in Function:   Patient challenged appropriately with abdominal PREs. Min cueing for mild knee flexion with all RDL attempts. Patient will continue to benefit from skilled PT services to modify and progress therapeutic interventions, address functional mobility deficits, address ROM deficits, address strength deficits, analyze and address soft tissue restrictions, analyze and cue movement patterns, analyze and modify body mechanics/ergonomics, assess and modify postural abnormalities, and address imbalance/dizziness to attain remaining goals. []  See Plan of Care  []  See progress note/recertification  []  See Discharge Summary         Progress towards goals / Updated goals:  Short Term Goals: To be accomplished in 1 week  - Goal: Pt to be compliant with initial HEP to improve ability to independently manage symptoms. Status at last note/certification: Established and reviewed with Pt  Current: goal met; pt notes compliance (9/19/22)  Long Term Goals: To be accomplished in 10 treatments  - Goal: Pt to ambulate at least 525' during 2MWT to improve ease with prolonged community ambulation. Status at last note/certification: 616' without AD  Current: good tolerance to TM walking (9/29/22)  - Goal: Pt to perform 10 floor<>waist lifts with 20 lbs resistance without increased pain to increase ease with ADLs.   Status at last note/certification: difficulty with lifting  Current: improved tolerance to 10# KB deadlifts (9/22/22) - no difficulty with squat PREs today (9/26/22)  - Goal: Pt to demonstrate at least 4+/5 B hip extension MMT to increase ease of prolonged standing. Status at last note/certification: right 4-/5, left 4/5  Current: initiate donkey kicks to progress to goal (10/10/22)  - Goal: Pt to report FOTO score of at least 63 pts to demonstrate improved function and quality of life.   Status at last note/certification: FOTO 58 pts   (Progress Note due by 10/16/22)    PLAN  [x]  Upgrade activities as tolerated     [x]  Continue plan of care  []  Update interventions per flow sheet       []  Discharge due to:_  [x]  Other: reassessment due GAURI Guy, PTA 10/13/2022

## 2022-10-17 ENCOUNTER — HOSPITAL ENCOUNTER (OUTPATIENT)
Dept: PHYSICAL THERAPY | Age: 67
Discharge: HOME | End: 2022-10-17
Payer: MEDICARE

## 2022-10-17 PROCEDURE — 97110 THERAPEUTIC EXERCISES: CPT

## 2022-10-17 PROCEDURE — 97530 THERAPEUTIC ACTIVITIES: CPT

## 2022-10-17 PROCEDURE — 97140 MANUAL THERAPY 1/> REGIONS: CPT

## 2022-10-17 NOTE — PROGRESS NOTES
PT DAILY TREATMENT NOTE     Patient Name: Jason Moreno  Date:10/17/2022  : 1955  [x]  Patient  Verified  Payor: Gage Suarez / Plan: [place] PART A [employer] B / Product Type: Medicare /    In time: 12:20 pm          Out time: 1:04 pm  Total Treatment Time (min): 44  Total Timed Codes (min): 44  1:1 Treatment Time (min): 39  Visit #: 8 of 8-10    Treatment Area: Other low back pain [M54.59]    SUBJECTIVE  Pain Level (0-10 scale): 0  Any medication changes, allergies to medications, adverse drug reactions, diagnosis change, or new procedure performed?: [x] No    [] Yes (see summary sheet for update)  Subjective functional status/changes:   [] No changes reported  Patient notes overall improvement since beginning treatment. OBJECTIVE  Modality rationale: PD due to no pain     Min Type Additional Details    [] Estim: []Att   []Unatt                  []IFC  []Premod                                            []NMES    []Other:  []w/ice   []w/heat  Position:   Location:     []  Traction: [] Cervical       [] Lumbar                       [] Supine        [] Prone                       []Intermittent   []Continuous Lbs:  [] before manual  [] after manual    []  Ice     []  Heat Position:  Location:   [] Skin assessment post-treatment:  []intact    []redness- no adverse reaction                                                                                 []redness - adverse reaction:     21 min Therapeutic Exercise:  [x] See flow sheet: reassessment   Rationale: increase ROM and increase strength to improve the patients ability to perform ADLs. 10 min Therapeutic Activity:  [x]  See flow sheet: reassessment, squatting   Rationale: increase strength, improve coordination, improve balance, and increase proprioception  to improve the patients ability to improve squatting, bending.       3 min Neuromuscular Re-education:  [x]  See flow sheet: initiated 2MWT   Rationale: improve coordination, improve balance, and increase proprioception  to improve the patients ability to turning, pivoting. 10 min Manual Therapy:  massage gun STM to (B) glute max   Rationale: decrease pain to improve patient's ability to perform ADLs          with TE min Patient Education: [x] Review HEP     Other Objective/Functional Measures:   2MWT: 500 feet  Liftin# crate, floor<>waist, 10 reps    Hip strength: abduction (B) 4/5, extension (R) 4/5 (L) 4+/5  FOTO score: 67/100    Improvements: less pain, walking tolerance, yardwork, painting, transfers, bed mobility  Deficits: floor transfers, pulling weeds    Pain Level (0-10 scale) post treatment: 0    ASSESSMENT/Changes in Function:   See PN. Patient will continue to benefit from skilled PT services to modify and progress therapeutic interventions, address functional mobility deficits, address ROM deficits, address strength deficits, analyze and address soft tissue restrictions, analyze and cue movement patterns, analyze and modify body mechanics/ergonomics, assess and modify postural abnormalities, and address imbalance/dizziness to attain remaining goals. []  See Plan of Care  [x]  See progress note/recertification  []  See Discharge Summary         Progress towards goals / Updated goals:  Short Term Goals: To be accomplished in 1 week  - Goal: Pt to be compliant with initial HEP to improve ability to independently manage symptoms. Status at last note/certification: Established and reviewed with Pt  Current: goal met; pt notes compliance (22)  Long Term Goals: To be accomplished in 10 treatments  - Goal: Pt to ambulate at least 525' during 2MWT to improve ease with prolonged community ambulation. Status at last note/certification: 912' without AD  Current: good tolerance to TM walking (22)  - Goal: Pt to perform 10 floor<>waist lifts with 20 lbs resistance without increased pain to increase ease with ADLs.   Status at last note/certification: difficulty with lifting  Current: improved tolerance to 10# KB deadlifts (9/22/22) - no difficulty with squat PREs today (9/26/22)  - Goal: Pt to demonstrate at least 4+/5 B hip extension MMT to increase ease of prolonged standing. Status at last note/certification: right 4-/5, left 4/5  Current: initiate donkey kicks to progress to goal (10/10/22)  - Goal: Pt to report FOTO score of at least 63 pts to demonstrate improved function and quality of life.   Status at last note/certification: FOTO 58 pts   (Progress Note due by 10/16/22)    PLAN  [x]  Upgrade activities as tolerated     [x]  Continue plan of care  []  Update interventions per flow sheet       []  Discharge due to:_  [x]  Other: see PN; cont for remaining three treatments    Edmond Ron, PTA 10/17/2022

## 2022-10-20 ENCOUNTER — HOSPITAL ENCOUNTER (OUTPATIENT)
Dept: PHYSICAL THERAPY | Age: 67
Discharge: HOME | End: 2022-10-20
Payer: MEDICARE

## 2022-10-20 PROCEDURE — 97112 NEUROMUSCULAR REEDUCATION: CPT

## 2022-10-20 PROCEDURE — 97014 ELECTRIC STIMULATION THERAPY: CPT

## 2022-10-20 PROCEDURE — 97110 THERAPEUTIC EXERCISES: CPT

## 2022-10-20 PROCEDURE — 97530 THERAPEUTIC ACTIVITIES: CPT

## 2022-10-20 NOTE — PROGRESS NOTES
107 [facility] AT [address] [place] [address]  Phone: [phone] Fax: [phone]  PROGRESS NOTE  Patient Name: Juan Schmitz : 1955   Treatment/Medical Diagnosis: Other low back pain [M54.59]   Referral Source: Earnestine Connelly MD     Date of Initial Visit: 22 Attended Visits: 8 Missed Visits: 0     SUMMARY OF TREATMENT  Pt is a pleasant 79 y.o. female who presents with c/o low back pain and right hip pain. The patient reports a chronic history of low back pain and right hip pain following right hip PRAKASH in 2019. Treatment has consisted of the following:  Therapeutic exercise, Therapeutic activities, Neuromuscular re-education, Physical agent/modality, Gait/balance training, Manual therapy, Patient education, Self Care training, Functional mobility training, Home safety training, and Stair training. CURRENT STATUS  Patient has attended eight sessions, reporting a significant reduction in pain and ability to return to long duration walks daily. C/c regarding difficulty with fully squatting to perform yardwork and ascending stairs (I) without handrail use. Assessment as follows:  2MWT: 500 feet  Liftin# crate, floor<>waist, 10 reps  Hip strength: abduction (B) 4/5, extension (R) 4/5 (L) 4+/5  FOTO score: 67/100  Improvements: less pain, walking tolerance, yardwork, painting, transfers, bed mobility  Deficits: floor transfers, pulling weeds    Goal/Measure of Progress   - Goal: Pt to ambulate at least 525' during 2MWT to improve ease with prolonged community ambulation. Status at last note/certification: 032' without AD  Current: goal progressing; 500 feet without AD  - Goal: Pt to perform 10 floor<>waist lifts with 20 lbs resistance without increased pain to increase ease with ADLs.   Status at last note/certification: difficulty with lifting  Current: goal met; patient able to perform floor to waist lifting at 20# resistance without c/o LBP  - Goal: Pt to demonstrate at least 4+/5 B hip extension MMT to increase ease of prolonged standing. Status at last note/certification: right 4-/5, left 4/5  Current: goal progressing; extension (R) 4/5 (L) 4+/5  - Goal: Pt to report FOTO score of at least 63 pts to demonstrate improved function and quality of life. Status at last note/certification: FOTO 58 pts   Current: goal met; 67/100    New Goals to be achieved in __3__  treatments:  1. Pt to ambulate at least 525' during 2MWT to improve ease with prolonged community ambulation. 2.  Pt to demonstrate at least 4+/5 B hip extension MMT to increase ease of prolonged standing. 3.  Pt will demo ability to ascend and descend one flight of 10 steps without handrail assistance for improved ease with stair negotiation. 4.  Pt will be (I) with finalized HEP in preparation for D/C to self-management of symptoms. RECOMMENDATIONS  Pt to continue with skilled therapy for 1-2x/week for 10 sessions to improve ease with gardening, stair negotiation, and floor<>waist lifts. If you have any questions/comments please contact us directly at [phone]. Thank you for allowing us to assist in the care of your patient. LPTA Signature: [place], [place]  Date: 10/17/2022   PT Signature: Alcides Sandy Time: 6:00 PM   NOTE TO PHYSICIAN:  PLEASE COMPLETE THE ORDERS BELOW AND FAX TO   [facility]: [phone]. If you are unable to process this request in 24 hours please contact our office: [phone].  ___ I have read the above report and request that my patient continue as recommended.   ___ I have read the above report and request that my patient continue therapy with the following changes/special instructions:_________________________________________________________   ___ I have read the above report and request that my patient be discharged from therapy.      Physician Signature:        Date: Time:   Reporting Period: 9/16/22-10/17/22

## 2022-10-20 NOTE — PROGRESS NOTES
PT DAILY TREATMENT NOTE 8-14    Patient Name: Michael Wright  Date:10/20/2022  : 1955  [x]  Patient  Verified  Payor: Kathy Westfall / Plan: [place] & [place] / Product Type: Medicare /    In time: 10:47 am          Out time: 11:45 am  Total Treatment Time (min): 58  Total Timed Codes (min): 48  1:1 Treatment Time (min): 40  Visit #: 1 of 10    Treatment Area: Other low back pain [M54.59]    SUBJECTIVE  Pain Level (0-10 scale): 3 in low back  Any medication changes, allergies to medications, adverse drug reactions, diagnosis change, or new procedure performed?: [x] No    [] Yes (see summary sheet for update)  Subjective functional status/changes:   [] No changes reported  \"I did a ton of yardwork the other day. There is no way I would kneel for that. \"    OBJECTIVE  Modality rationale: decrease pain to improve the patient's ability to perform HEP     Min Type Additional Details   10 [x] Estim: []Att   [x]Unatt                  [x]IFC  []Premod                                            []NMES    []Other:  []w/ice   [x]w/heat  Position: reclined  Location: L/S    []  Traction: [] Cervical       [] Lumbar                       [] Supine        [] Prone                       []Intermittent   []Continuous Lbs:  [] before manual  [] after manual    []  Ice     []  Heat Position:  Location:   [x] Skin assessment post-treatment:  [x]intact    []redness- no adverse reaction                                                                                 []redness - adverse reaction:     18 min Therapeutic Exercise:  [x] See flow sheet:    Rationale: increase ROM and increase strength to improve the patients ability to perform ADLs. 18 min Therapeutic Activity:  [x]  See flow sheet:    Rationale: increase strength, improve coordination, improve balance, and increase proprioception  to improve the patients ability to improve squatting, bending.       12 min Neuromuscular Re-education:  [x]  See flow sheet: added one hundreds   Rationale: improve coordination, improve balance, and increase proprioception  to improve the patients ability to turning, pivoting. 0 min Manual Therapy:  NT   Rationale: decrease pain to improve patient's ability to perform ADLs          with TE min Patient Education: [x] Review HEP     Other Objective/Functional Measures:     Pain Level (0-10 scale) post treatment: 0    ASSESSMENT/Changes in Function:   Patient challenged with progressive abdominal strengthening req cueing for TA draw. Most challenged with deep squatting as required by yardwork (pulling weeds) due to knee pain with kneeling and general avoidance of this. Patient will continue to benefit from skilled PT services to modify and progress therapeutic interventions, address functional mobility deficits, address ROM deficits, address strength deficits, analyze and address soft tissue restrictions, analyze and cue movement patterns, analyze and modify body mechanics/ergonomics, assess and modify postural abnormalities, and address imbalance/dizziness to attain remaining goals. []  See Plan of Care  [x]  See progress note/recertification  []  See Discharge Summary         Progress towards goals / Updated goals:  Short Term Goals: To be accomplished in 1 week  - Goal: Pt to be compliant with initial HEP to improve ability to independently manage symptoms. Status at last note/certification: Established and reviewed with Pt  Current: goal met; pt notes compliance (9/19/22)  Long Term Goals: To be accomplished in 10 treatments  - Goal: Pt to ambulate at least 525' during 2MWT to improve ease with prolonged community ambulation. Status at last note/certification: 918' without AD  Current: good tolerance to TM walking (9/29/22)  - Goal: Pt to perform 10 floor<>waist lifts with 20 lbs resistance without increased pain to increase ease with ADLs.   Status at last note/certification: difficulty with lifting  Current: improved tolerance to 10# KB deadlifts (9/22/22) - no difficulty with squat PREs today (9/26/22)  - Goal: Pt to demonstrate at least 4+/5 B hip extension MMT to increase ease of prolonged standing. Status at last note/certification: right 4-/5, left 4/5  Current: initiate donkey kicks to progress to goal (10/10/22)  - Goal: Pt to report FOTO score of at least 63 pts to demonstrate improved function and quality of life.   Status at last note/certification: FOTO 58 pts   (Progress Note due by 10/16/22)    PLAN  [x]  Upgrade activities as tolerated     [x]  Continue plan of care  []  Update interventions per flow sheet       []  Discharge due to:_  [x]  Other: see PN; cont for remaining three treatments; recommend deep leg press    Birgit Salazar, PTA 10/20/2022

## 2022-10-31 ENCOUNTER — HOSPITAL ENCOUNTER (OUTPATIENT)
Dept: PHYSICAL THERAPY | Age: 67
Discharge: HOME | End: 2022-10-31
Payer: MEDICARE

## 2022-10-31 PROCEDURE — 97014 ELECTRIC STIMULATION THERAPY: CPT

## 2022-10-31 PROCEDURE — 97112 NEUROMUSCULAR REEDUCATION: CPT

## 2022-10-31 PROCEDURE — 97110 THERAPEUTIC EXERCISES: CPT

## 2022-10-31 PROCEDURE — 97140 MANUAL THERAPY 1/> REGIONS: CPT

## 2022-10-31 NOTE — PROGRESS NOTES
PT DAILY TREATMENT NOTE 8-14    Patient Name: Ana María Richardson  Date:10/31/2022  : 1955  [x]  Patient  Verified  Payor: Mariaa Ann / Plan: [place] MEDICARE PART A & B / Product Type: Medicare /    In time: 11:34 am          Out time: 12:31 pm  Total Treatment Time (min): 57  Total Timed Codes (min): 47  1:1 Treatment Time (min): 42  Visit #: 2 of 10    Treatment Area: Other low back pain [M54.59]    SUBJECTIVE  Pain Level (0-10 scale): 2  Any medication changes, allergies to medications, adverse drug reactions, diagnosis change, or new procedure performed?: [x] No    [] Yes (see summary sheet for update)  Subjective functional status/changes:   [] No changes reported  Patient notes doing well today. OBJECTIVE  Modality rationale: decrease pain to improve the patient's ability to perform HEP     Min Type Additional Details   10 [x] Estim:   [x]Unatt                     [x]IFC   []w/ice   [x]w/heat  Position: reclined  Location: L/S    []  Traction: [] Cervical       [] Lumbar                       [] Supine        [] Prone                       []Intermittent   []Continuous Lbs:  [] before manual  [] after manual    []  Ice     []  Heat Position:  Location:   [x] Skin assessment post-treatment:  [x]intact    []redness- no adverse reaction                                                                                 []redness - adverse reaction:     8 min Therapeutic Exercise:  [x] See flow sheet:    Rationale: increase ROM and increase strength to improve the patients ability to perform ADLs. 19 min Therapeutic Activity:  [x]  See flow sheet: added deep leg press at 85#   Rationale: increase strength, improve coordination, improve balance, and increase proprioception  to improve the patients ability to improve squatting, bending.       12 min Neuromuscular Re-education:  [x]  See flow sheet:    Rationale: improve coordination, improve balance, and increase proprioception  to improve the patients ability to turning, pivoting. 8 min Manual Therapy: massage gun STM to (B) glute and QL   Rationale: decrease pain to improve patient's ability to perform ADLs          with TE min Patient Education: [x] Review HEP     Other Objective/Functional Measures:     Pain Level (0-10 scale) post treatment: 0    ASSESSMENT/Changes in Function:   Patient challenged with introduction of graded, resistive deep squatting interventions, requiring cueing for avoidance of valgus collapse and heel drive to increase glute recruitment. Patient will continue to benefit from skilled PT services to modify and progress therapeutic interventions, address functional mobility deficits, address ROM deficits, address strength deficits, analyze and address soft tissue restrictions, analyze and cue movement patterns, analyze and modify body mechanics/ergonomics, assess and modify postural abnormalities, and address imbalance/dizziness to attain remaining goals. []  See Plan of Care  [x]  See progress note/recertification  []  See Discharge Summary         Progress towards goals / Updated goals:  Short Term Goals: to be accomplished in 1 week  - Goal: Pt to be compliant with initial HEP to improve ability to independently manage symptoms. Status at last note/certification: Established and reviewed with Pt  Current: goal met; pt notes compliance (9/19/22)  Long Term Goals: to be accomplished in 10 treatments  - Goal: Pt to ambulate at least 525' during 2MWT to improve ease with prolonged community ambulation. Status at last note/certification: 638' without AD  Current: good tolerance to TM walking (9/29/22)  - Goal: Pt to perform 10 floor<>waist lifts with 20 lbs resistance without increased pain to increase ease with ADLs.   Status at last note/certification: difficulty with lifting  Current: improved tolerance to 10# KB deadlifts (9/22/22) - no difficulty with squat PREs today (9/26/22)  - Goal: Pt to demonstrate at least 4+/5 B hip extension MMT to increase ease of prolonged standing. Status at last note/certification: right 4-/5, left 4/5  Current: initiate donkey kicks to progress to goal (10/10/22)  - Goal: Pt to report FOTO score of at least 63 pts to demonstrate improved function and quality of life.   Status at last note/certification: FOTO 58 pts   (Progress Note due by 10/16/22)    PLAN  [x]  Upgrade activities as tolerated     [x]  Continue plan of care  []  Update interventions per flow sheet       []  Discharge due to:_  []  Other:_    Tony Murphy, PTA 10/31/2022

## 2022-11-03 ENCOUNTER — APPOINTMENT (OUTPATIENT)
Dept: PHYSICAL THERAPY | Age: 67
End: 2022-11-03
Payer: MEDICARE

## 2022-11-16 ENCOUNTER — HOSPITAL ENCOUNTER (OUTPATIENT)
Dept: PHYSICAL THERAPY | Age: 67
Discharge: HOME | End: 2022-11-16
Payer: MEDICARE

## 2022-11-16 PROCEDURE — 97112 NEUROMUSCULAR REEDUCATION: CPT

## 2022-11-16 PROCEDURE — 97140 MANUAL THERAPY 1/> REGIONS: CPT

## 2022-11-16 PROCEDURE — 97110 THERAPEUTIC EXERCISES: CPT

## 2022-11-16 PROCEDURE — 97014 ELECTRIC STIMULATION THERAPY: CPT

## 2022-11-16 NOTE — PROGRESS NOTES
PT DAILY TREATMENT NOTE 8-14    Patient Name: Ly Albert  Date:2022  : 1955  [x]  Patient  Verified  Payor: Rashmi Counter / Plan: [place] MEDICARE PART A & B / Product Type: Medicare /    In time: 12:22 pm          Out time: 1:28 pm  Total Treatment Time (min): 66  Total Timed Codes (min): 56  1:1 Treatment Time (min): 49  Visit #: 3 of 10    Treatment Area: Other low back pain [M54.59]    SUBJECTIVE  Pain Level (0-10 scale): 3  Any medication changes, allergies to medications, adverse drug reactions, diagnosis change, or new procedure performed?: [x] No    [] Yes (see summary sheet for update)  Subjective functional status/changes:   [] No changes reported  \"Sorry I'm late. I am pretty stiff today, but I was able to walk for a long time on my trip. \"    OBJECTIVE  Modality rationale: decrease pain to improve the patient's ability to perform HEP     Min Type Additional Details   10 [x] Estim:   [x]Unatt                     [x]IFC   []w/ice   [x]w/heat  Position: reclined  Location: L/S    []  Traction: [] Cervical       [] Lumbar                       [] Supine        [] Prone                       []Intermittent   []Continuous Lbs:  [] before manual  [] after manual    []  Ice     []  Heat Position:  Location:   [x] Skin assessment post-treatment:  [x]intact    []redness- no adverse reaction                                                                                 []redness - adverse reaction:     24 min Therapeutic Exercise:  [x] See flow sheet: reassessment   Rationale: increase ROM and increase strength to improve the patients ability to perform ADLs. 5 min Therapeutic Activity:  [x]  See flow sheet: stairs   Rationale: increase strength, improve coordination, improve balance, and increase proprioception  to improve the patients ability to improve squatting, bending.       15 min Neuromuscular Re-education:  [x]  See flow sheet:    Rationale: improve coordination, improve balance, and increase proprioception  to improve the patients ability to turning, pivoting. 12 min Manual Therapy: massage gun STM to (B) glute and QL   Rationale: decrease pain to improve patient's ability to perform ADLs          with TE min Patient Education: [x] Review HEP     Other Objective/Functional Measures:   2MWT: 500 feet+  Hip strength: abduction (B) 4+/5, extension (R) 4/5 (L) 4+/5  FOTO score: 67/100  Improvements: prolonged sitting, long duration walking while on vacation, stairs  Deficits: stiffness with long car trips (sitting > 2 hours)    Pain Level (0-10 scale) post treatment: 0    ASSESSMENT/Changes in Function:   See D/C. Patient will continue to benefit from skilled PT services to modify and progress therapeutic interventions, address functional mobility deficits, address ROM deficits, address strength deficits, analyze and address soft tissue restrictions, analyze and cue movement patterns, analyze and modify body mechanics/ergonomics, assess and modify postural abnormalities, and address imbalance/dizziness to attain remaining goals. [x]  See Discharge Summary         Progress towards goals / Updated goals:  1. Pt to ambulate at least 525' during 2MWT to improve ease with prolonged community ambulation. Status at last note/certification: 724' without AD  Current: goal met; >525 feet  2. Pt to demonstrate at least 4+/5 B hip extension MMT to increase ease of prolonged standing. -Goal partially met; left 4+/5, right 4/5  3. Pt will demo ability to ascend and descend one flight of 10 steps without handrail assistance for improved ease with stair negotiation. -Goal progressing; pt able to negotiate 12 steps (I) when ascending and minimal HR use with descending for balance  4.   Pt will be (I) with finalized HEP in preparation for D/C to self-management of symptoms. -Goal met; HEP finalized with patient noting understanding    PLAN  [x]  Discharge due to: program complete    Herminio Herbert PTA 11/16/2022

## 2022-11-16 NOTE — PROGRESS NOTES
Physical Therapy Discharge Instructions      [facility] - [address] [facility]  [phone] fax      Patient: Ly Albert  : 1955      Continue Home Exercise Program 1 time per day:      Continue with    [x] Heat, as needed           Follow up with MD:      [x] As needed      Recommendations:     [x]   Return to activity with home program          Additional Comments: It is always a pleasure working with you! Please maintain consistency with your home program to further improve your overall strength and well-being. Stay strong and healthy!       Edinson Gill, PTA  2022

## 2023-01-04 NOTE — PROGRESS NOTES
[facility] AT [address] [place], [place] [address]  Phone: [phone] Fax [phone]  DISCHARGE SUMMARY  Patient Name: Jenni Venegas : 1955   Treatment/Medical Diagnosis: Other low back pain [M54.59]   Referral Source: Luke Ortez MD     Date of Initial Visit: 22 Attended Visits: 11 Missed Visits: 0     SUMMARY OF TREATMENT  Pt is a pleasant 79 y.o. female who presents with c/o low back pain and right hip pain. The patient reports a chronic history of low back pain and right hip pain following right hip PRAKASH in 2019. Treatment has consisted of the following:  Therapeutic exercise, Therapeutic activities, Neuromuscular re-education, Physical agent/modality, Gait/balance training, Manual therapy, Patient education, Self Care training, Functional mobility training, Home safety training, and Stair training. CURRENT STATUS  Pt attended therapy consistently for 11 visits for the treatment of low back pain and right hip pain. At this point, the patient reports significant improvement since [facility] with specific improvements in the following areas: improved hip strength, improved standing/ambulation tolerance, and improved ability to independently manage pain/discomfort. The patient has also demonstrated improvement in her FOTO score from 58 pts to 67 pts, demonstrating improved function in the home and community. The patient is appropriate for discharge at this time with a comprehensive HEP and will follow up with our office about any questions that arise following discharge. Thank you for this referral.    2MWT: 500 feet+  Hip strength: abduction (B) 4+/5, extension (R) 4/5 (L) 4+/5  FOTO score: 67/100  Improvements: prolonged sitting, long duration walking while on vacation, stairs  Deficits: stiffness with long car trips (sitting > 2 hours)    1.   Pt to ambulate at least 525' during 2MWT to improve ease with prolonged community ambulation. Status at last note/certification: 694' without AD  Current: goal met; >525 feet  2. Pt to demonstrate at least 4+/5 B hip extension MMT to increase ease of prolonged standing. -Goal partially met; left 4+/5, right 4/5  3. Pt will demo ability to ascend and descend one flight of 10 steps without handrail assistance for improved ease with stair negotiation. -Goal progressing; pt able to negotiate 12 steps (I) when ascending and minimal HR use with descending for balance  4. Pt will be (I) with finalized HEP in preparation for D/C to self-management of symptoms. -Goal met; HEP finalized with patient noting understanding    RECOMMENDATIONS  Discontinue therapy. Progressing towards or have reached established goals. If you have any questions/comments please contact us directly at [phone]. Thank you for allowing us to assist in the care of your patient.   Therapist Signature: Magui Gleason Date: 1/4/23   Reporting Period: 10/18/22-11/16/22 Time: 1:24 PM

## 2024-09-17 NOTE — PROGRESS NOTES
[address] [facility] [address] [facility] AT [address] [place] [address]     Phone: [phone] Fax: [phone]  DISCHARGE SUMMARY  Patient Name: Lorna Hull : 1955   Treatment/Medical Diagnosis: Other low back pain [M54.59]   Referral Source: Garrett Luna MD     Date of Initial Visit: 22 Attended Visits: 10 Missed Visits: 0     SUMMARY OF TREATMENT  Pt is a 68 yo female with ongoing (R) glute, intermittent back and buttox pain prior to R THR and then worsening/not resolving after joint replacement. Treatment has consisted of the following: Therapeutic exercise, Therapeutic activities, Neuromuscular re-education, Physical agent/modality, Gait/balance training, Manual therapy, Patient education, Self Care training, Functional mobility training, Home safety training and Stair training; [address] CURRENT STATUS  Patient has made great progress in PT within 10 treatments, reporting a significant decrease in pain and improvement in functional mobility since start of care, likely due to improved hip AROM/strength. Patient feels ready for D/C at this time due to obtaining sufficient tools for recovery and being able to appreciate gradual improvements within the 6-week timeframe. Assessment as follows:    FOTO score: 61/100 (at last assessment, 57/100)  Improvements: reduced daily pain, transfers, deep squatting as required by gardening  (R) hip AROM: flex 100 deg, abd 30 deg, ER 60 deg (prone), IR 30 deg (prone)  Hip strength: flex 5/5, glut med 4-/5, glut max 3+/5  SLS: (B) 5 seconds  Pain: (A) 0-1, (B) 0, (W) 2     Goal/Measure of Progress:  1. Pt will increase score on the FOTO to > or = 64/100 to demo an increase in functional activity tolerance. Status at last assessment: 57/100  Current: goal progressing; 61/100  2.  Pt will be able to self-manage ave pain to < or = 2/10 ave  to improve ease of posture, mobility and self-care  Status at last assessment: ranges 1-2/10 on average  Current: goal met; 0-2/10, 2 at max  3. Pt will have an increase in (R) hip and quad MMT to > or = 4+/5 all planes to improve mechanics for standing, balance, gait and ADLs. Status at last assessment:  (R) hip strength: flex 4/5, abd 4/5 (glut med 4-/5), ext 4/5 (glut max 3/5), ER 4-/5, IR 3+/5   Current: goal progressing;  flex 5/5, glut med 4-/5, glut max 3+/5  4. Pt will be able to walk for > or = 30 min with no increase in pain to restore PLOF  Status at last assessment: 20 minutes on treadmill prior to onset of pain and fatigue in the posterior hip   Current: goal progressing; patient notes ability to walk 1 hour with rest breaks    Home exercise program established on initial evaluation and progressed as patient is able to address deficits. Patient now has all of the knowledge to continue with progress per HEP. RECOMMENDATIONS  Pt is appropriate for DC to HEp at this time. She notes agreement of importance of HEP compliance to con't to progress in her improvement of function. Thank you. If you have any questions/comments please contact us directly at [phone]. Thank you for allowing us to assist in the care of your patient.     LPTA Signature: Jeanmarie Perez Date: 3/3/22   Therapist Signature: Brooks Curling, DPT Time: 2:58 PM   Reporting Period:  1/13/22-2/25/22 36.6

## (undated) DEVICE — PACK PROCEDURE SURG TOT HIP ANTR CARTER CUST

## (undated) DEVICE — THE CANADY HYBRID PLASMA SCALPEL IS AN ELECTROSURGICAL PLASMA SCALPEL THAT USES AN 85MM BENDABLE PADDLE BLADE TIP. THE ELECTROSURGICAL PLASMA SCALPEL IS USED TO SIMULTANEOUSLY CUT AND COAGULATE BIOLOGICAL TISSUE.: Brand: CANADY HYBRID PLASMA PADDLE BLADE

## (undated) DEVICE — SOL INJ L R 1000ML BG --

## (undated) DEVICE — SOL IRRIGATION INJ NACL 0.9% 500ML BTL

## (undated) DEVICE — HANDPIECE SET WITH HIGH FLOW TIP AND SUCTION TUBE: Brand: INTERPULSE

## (undated) DEVICE — (D)PREP SKN CHLRAPRP APPL 26ML -- CONVERT TO ITEM 371833

## (undated) DEVICE — SUT VCRL + 1 36IN CT1 VIO --

## (undated) DEVICE — BLADE SAW 1.27X13X90 MM FOR LG BNE

## (undated) DEVICE — SUT VCRL + 2-0 36IN CT1 UD --

## (undated) DEVICE — NEEDLE SPNL 20GA L3.5IN YEL HUB S STL REG WALL FIT STYL W/

## (undated) DEVICE — ZIP 16 SURGICAL SKIN CLOSURE DEVICE: Brand: ZIP 16 SURGICAL SKIN CLOSURE DEVICE

## (undated) DEVICE — GARMENT,MEDLINE,DVT,INT,CALF,MED, GEN2: Brand: MEDLINE

## (undated) DEVICE — DRSG MEPILEX BORDER AG 4X8 -- 5/BX